# Patient Record
Sex: FEMALE | Race: BLACK OR AFRICAN AMERICAN | NOT HISPANIC OR LATINO | Employment: FULL TIME | URBAN - METROPOLITAN AREA
[De-identification: names, ages, dates, MRNs, and addresses within clinical notes are randomized per-mention and may not be internally consistent; named-entity substitution may affect disease eponyms.]

---

## 2020-07-09 ENCOUNTER — COMMUNICATION - HEALTHEAST (OUTPATIENT)
Dept: SCHEDULING | Facility: CLINIC | Age: 35
End: 2020-07-09

## 2020-07-09 ENCOUNTER — COMMUNICATION - HEALTHEAST (OUTPATIENT)
Dept: EMERGENCY MEDICINE | Facility: CLINIC | Age: 35
End: 2020-07-09

## 2020-07-10 ENCOUNTER — AMBULATORY - HEALTHEAST (OUTPATIENT)
Dept: CARE COORDINATION | Facility: CLINIC | Age: 35
End: 2020-07-10

## 2020-07-10 ENCOUNTER — COMMUNICATION - HEALTHEAST (OUTPATIENT)
Dept: NURSING | Facility: CLINIC | Age: 35
End: 2020-07-10

## 2020-07-10 DIAGNOSIS — U07.1 2019 NOVEL CORONAVIRUS DISEASE (COVID-19): ICD-10-CM

## 2020-07-20 ENCOUNTER — AMBULATORY - HEALTHEAST (OUTPATIENT)
Dept: CARE COORDINATION | Facility: CLINIC | Age: 35
End: 2020-07-20

## 2020-07-20 DIAGNOSIS — U07.1 2019 NOVEL CORONAVIRUS DISEASE (COVID-19): ICD-10-CM

## 2020-07-21 ENCOUNTER — COMMUNICATION - HEALTHEAST (OUTPATIENT)
Dept: NURSING | Facility: CLINIC | Age: 35
End: 2020-07-21

## 2020-09-03 ENCOUNTER — COMMUNICATION - HEALTHEAST (OUTPATIENT)
Dept: FAMILY MEDICINE | Facility: CLINIC | Age: 35
End: 2020-09-03

## 2020-09-03 ENCOUNTER — PRENATAL OFFICE VISIT - HEALTHEAST (OUTPATIENT)
Dept: FAMILY MEDICINE | Facility: CLINIC | Age: 35
End: 2020-09-03

## 2020-09-03 DIAGNOSIS — M54.9 BACK PAIN AFFECTING PREGNANCY IN FIRST TRIMESTER: ICD-10-CM

## 2020-09-03 DIAGNOSIS — M54.50 CHRONIC BILATERAL LOW BACK PAIN WITHOUT SCIATICA: ICD-10-CM

## 2020-09-03 DIAGNOSIS — G89.29 CHRONIC BILATERAL LOW BACK PAIN WITHOUT SCIATICA: ICD-10-CM

## 2020-09-03 DIAGNOSIS — Z86.16 HISTORY OF 2019 NOVEL CORONAVIRUS DISEASE (COVID-19): ICD-10-CM

## 2020-09-03 DIAGNOSIS — F43.10 PTSD (POST-TRAUMATIC STRESS DISORDER): ICD-10-CM

## 2020-09-03 DIAGNOSIS — Z86.32 H/O GESTATIONAL DIABETES IN PRIOR PREGNANCY, CURRENTLY PREGNANT: ICD-10-CM

## 2020-09-03 DIAGNOSIS — O99.891 BACK PAIN AFFECTING PREGNANCY IN FIRST TRIMESTER: ICD-10-CM

## 2020-09-03 DIAGNOSIS — O21.9 NAUSEA AND VOMITING IN PREGNANCY: ICD-10-CM

## 2020-09-03 DIAGNOSIS — E66.01 MORBID OBESITY (H): ICD-10-CM

## 2020-09-03 DIAGNOSIS — O09.299 H/O GESTATIONAL DIABETES IN PRIOR PREGNANCY, CURRENTLY PREGNANT: ICD-10-CM

## 2020-09-03 DIAGNOSIS — F39 MOOD DISORDER (H): ICD-10-CM

## 2020-09-03 DIAGNOSIS — O09.529 SUPERVISION OF HIGH-RISK PREGNANCY OF ELDERLY MULTIGRAVIDA: ICD-10-CM

## 2020-09-03 LAB
ALBUMIN UR-MCNC: NEGATIVE MG/DL
APPEARANCE UR: CLEAR
BASOPHILS # BLD AUTO: 0 THOU/UL (ref 0–0.2)
BASOPHILS NFR BLD AUTO: 0 % (ref 0–2)
BILIRUB UR QL STRIP: NEGATIVE
COLOR UR AUTO: YELLOW
EOSINOPHIL # BLD AUTO: 0 THOU/UL (ref 0–0.4)
EOSINOPHIL NFR BLD AUTO: 1 % (ref 0–6)
ERYTHROCYTE [DISTWIDTH] IN BLOOD BY AUTOMATED COUNT: 13.2 % (ref 11–14.5)
GLUCOSE UR STRIP-MCNC: NEGATIVE MG/DL
HBA1C MFR BLD: 5.7 %
HCT VFR BLD AUTO: 37.7 % (ref 35–47)
HGB BLD-MCNC: 12.6 G/DL (ref 12–16)
HGB UR QL STRIP: NEGATIVE
HIV 1+2 AB+HIV1 P24 AG SERPL QL IA: NEGATIVE
IMM GRANULOCYTES # BLD: 0 THOU/UL
IMM GRANULOCYTES NFR BLD: 0 %
KETONES UR STRIP-MCNC: NEGATIVE MG/DL
LEUKOCYTE ESTERASE UR QL STRIP: NEGATIVE
LYMPHOCYTES # BLD AUTO: 1.4 THOU/UL (ref 0.8–4.4)
LYMPHOCYTES NFR BLD AUTO: 22 % (ref 20–40)
MCH RBC QN AUTO: 27.5 PG (ref 27–34)
MCHC RBC AUTO-ENTMCNC: 33.4 G/DL (ref 32–36)
MCV RBC AUTO: 82 FL (ref 80–100)
MONOCYTES # BLD AUTO: 0.3 THOU/UL (ref 0–0.9)
MONOCYTES NFR BLD AUTO: 5 % (ref 2–10)
NEUTROPHILS # BLD AUTO: 4.5 THOU/UL (ref 2–7.7)
NEUTROPHILS NFR BLD AUTO: 72 % (ref 50–70)
NITRATE UR QL: NEGATIVE
PH UR STRIP: 7 [PH] (ref 5–8)
PLATELET # BLD AUTO: 267 THOU/UL (ref 140–440)
PMV BLD AUTO: 10.9 FL (ref 8.5–12.5)
RBC # BLD AUTO: 4.58 MILL/UL (ref 3.8–5.4)
SP GR UR STRIP: 1.02 (ref 1–1.03)
UROBILINOGEN UR STRIP-ACNC: NORMAL
WBC: 6.3 THOU/UL (ref 4–11)

## 2020-09-03 ASSESSMENT — ANXIETY QUESTIONNAIRES
1. FEELING NERVOUS, ANXIOUS, OR ON EDGE: MORE THAN HALF THE DAYS
7. FEELING AFRAID AS IF SOMETHING AWFUL MIGHT HAPPEN: MORE THAN HALF THE DAYS
2. NOT BEING ABLE TO STOP OR CONTROL WORRYING: NEARLY EVERY DAY
IF YOU CHECKED OFF ANY PROBLEMS ON THIS QUESTIONNAIRE, HOW DIFFICULT HAVE THESE PROBLEMS MADE IT FOR YOU TO DO YOUR WORK, TAKE CARE OF THINGS AT HOME, OR GET ALONG WITH OTHER PEOPLE: VERY DIFFICULT
5. BEING SO RESTLESS THAT IT IS HARD TO SIT STILL: MORE THAN HALF THE DAYS
GAD7 TOTAL SCORE: 17
3. WORRYING TOO MUCH ABOUT DIFFERENT THINGS: NEARLY EVERY DAY
6. BECOMING EASILY ANNOYED OR IRRITABLE: NEARLY EVERY DAY
4. TROUBLE RELAXING: MORE THAN HALF THE DAYS

## 2020-09-03 ASSESSMENT — PATIENT HEALTH QUESTIONNAIRE - PHQ9: SUM OF ALL RESPONSES TO PHQ QUESTIONS 1-9: 16

## 2020-09-04 LAB
ABO/RH(D): NORMAL
ABORH REPEAT: NORMAL
ANTIBODY SCREEN: NEGATIVE
BACTERIA SPEC CULT: NO GROWTH
HBV SURFACE AG SERPL QL IA: NEGATIVE
RUBV IGG SERPL QL IA: POSITIVE
T PALLIDUM AB SER QL: NEGATIVE

## 2020-09-08 ENCOUNTER — COMMUNICATION - HEALTHEAST (OUTPATIENT)
Dept: FAMILY MEDICINE | Facility: CLINIC | Age: 35
End: 2020-09-08

## 2020-09-08 DIAGNOSIS — O09.529 SUPERVISION OF HIGH-RISK PREGNANCY OF ELDERLY MULTIGRAVIDA: ICD-10-CM

## 2020-09-15 ENCOUNTER — COMMUNICATION - HEALTHEAST (OUTPATIENT)
Dept: FAMILY MEDICINE | Facility: CLINIC | Age: 35
End: 2020-09-15

## 2020-09-15 DIAGNOSIS — O09.529 SUPERVISION OF HIGH-RISK PREGNANCY OF ELDERLY MULTIGRAVIDA: ICD-10-CM

## 2020-09-21 ENCOUNTER — OFFICE VISIT - HEALTHEAST (OUTPATIENT)
Dept: PHYSICAL THERAPY | Facility: REHABILITATION | Age: 35
End: 2020-09-21

## 2020-09-21 DIAGNOSIS — Z34.91 FIRST TRIMESTER PREGNANCY: ICD-10-CM

## 2020-09-21 DIAGNOSIS — M54.50 CHRONIC BILATERAL LOW BACK PAIN WITHOUT SCIATICA: ICD-10-CM

## 2020-09-21 DIAGNOSIS — M62.81 GENERALIZED MUSCLE WEAKNESS: ICD-10-CM

## 2020-09-21 DIAGNOSIS — G89.29 CHRONIC BILATERAL LOW BACK PAIN WITHOUT SCIATICA: ICD-10-CM

## 2020-09-25 ENCOUNTER — COMMUNICATION - HEALTHEAST (OUTPATIENT)
Dept: FAMILY MEDICINE | Facility: CLINIC | Age: 35
End: 2020-09-25

## 2020-09-28 ENCOUNTER — COMMUNICATION - HEALTHEAST (OUTPATIENT)
Dept: PHYSICAL THERAPY | Facility: REHABILITATION | Age: 35
End: 2020-09-28

## 2020-10-06 ENCOUNTER — COMMUNICATION - HEALTHEAST (OUTPATIENT)
Dept: PHYSICAL THERAPY | Facility: REHABILITATION | Age: 35
End: 2020-10-06

## 2020-10-07 ENCOUNTER — PRENATAL OFFICE VISIT - HEALTHEAST (OUTPATIENT)
Dept: FAMILY MEDICINE | Facility: CLINIC | Age: 35
End: 2020-10-07

## 2020-10-07 ENCOUNTER — RECORDS - HEALTHEAST (OUTPATIENT)
Dept: ADMINISTRATIVE | Facility: OTHER | Age: 35
End: 2020-10-07

## 2020-10-07 DIAGNOSIS — O99.519 ASTHMA AFFECTING PREGNANCY, ANTEPARTUM: ICD-10-CM

## 2020-10-07 DIAGNOSIS — J45.909 ASTHMA AFFECTING PREGNANCY, ANTEPARTUM: ICD-10-CM

## 2020-10-07 DIAGNOSIS — Z86.32 H/O GESTATIONAL DIABETES IN PRIOR PREGNANCY, CURRENTLY PREGNANT: ICD-10-CM

## 2020-10-07 DIAGNOSIS — O09.299 H/O GESTATIONAL DIABETES IN PRIOR PREGNANCY, CURRENTLY PREGNANT: ICD-10-CM

## 2020-10-07 DIAGNOSIS — O09.529 SUPERVISION OF HIGH-RISK PREGNANCY OF ELDERLY MULTIGRAVIDA: ICD-10-CM

## 2020-10-14 ENCOUNTER — COMMUNICATION - HEALTHEAST (OUTPATIENT)
Dept: FAMILY MEDICINE | Facility: CLINIC | Age: 35
End: 2020-10-14

## 2020-10-15 ENCOUNTER — COMMUNICATION - HEALTHEAST (OUTPATIENT)
Dept: FAMILY MEDICINE | Facility: CLINIC | Age: 35
End: 2020-10-15

## 2020-11-13 ENCOUNTER — PRENATAL OFFICE VISIT - HEALTHEAST (OUTPATIENT)
Dept: FAMILY MEDICINE | Facility: CLINIC | Age: 35
End: 2020-11-13

## 2020-11-13 DIAGNOSIS — Z20.822 SUSPECTED COVID-19 VIRUS INFECTION: ICD-10-CM

## 2020-11-13 DIAGNOSIS — Z86.16 HISTORY OF 2019 NOVEL CORONAVIRUS DISEASE (COVID-19): ICD-10-CM

## 2020-11-13 DIAGNOSIS — K21.00 GASTROESOPHAGEAL REFLUX DISEASE WITH ESOPHAGITIS WITHOUT HEMORRHAGE: ICD-10-CM

## 2020-11-13 DIAGNOSIS — O09.529 SUPERVISION OF HIGH-RISK PREGNANCY OF ELDERLY MULTIGRAVIDA: ICD-10-CM

## 2020-11-15 ENCOUNTER — COMMUNICATION - HEALTHEAST (OUTPATIENT)
Dept: SCHEDULING | Facility: CLINIC | Age: 35
End: 2020-11-15

## 2020-11-15 LAB
# FETUSES US: NORMAL
AFP MOM SERPL: 0.91
AFP SERPL-MCNC: 31 NG/ML
AGE - REPORTED: 34 YR
CURRENT SMOKER: NO
FAMILY MEMBER DISEASES HX: NO
GA METHOD: NORMAL
GA: NORMAL WK
IDDM PATIENT QL: NO
INTEGRATED SCN PATIENT-IMP: NORMAL
SPECIMEN DRAWN SERPL: NORMAL

## 2020-12-01 ENCOUNTER — COMMUNICATION - HEALTHEAST (OUTPATIENT)
Dept: FAMILY MEDICINE | Facility: CLINIC | Age: 35
End: 2020-12-01

## 2020-12-04 ENCOUNTER — HOSPITAL ENCOUNTER (OUTPATIENT)
Dept: ULTRASOUND IMAGING | Facility: CLINIC | Age: 35
Discharge: HOME | End: 2020-12-04
Attending: FAMILY MEDICINE

## 2020-12-04 DIAGNOSIS — O09.529 SUPERVISION OF HIGH-RISK PREGNANCY OF ELDERLY MULTIGRAVIDA: ICD-10-CM

## 2020-12-07 ENCOUNTER — COMMUNICATION - HEALTHEAST (OUTPATIENT)
Dept: FAMILY MEDICINE | Facility: CLINIC | Age: 35
End: 2020-12-07

## 2020-12-21 ENCOUNTER — HOSPITAL ENCOUNTER (OUTPATIENT)
Dept: ULTRASOUND IMAGING | Facility: CLINIC | Age: 35
Discharge: HOME | End: 2020-12-21
Attending: FAMILY MEDICINE

## 2020-12-21 DIAGNOSIS — O09.529 SUPERVISION OF HIGH-RISK PREGNANCY OF ELDERLY MULTIGRAVIDA: ICD-10-CM

## 2021-01-13 ENCOUNTER — PRENATAL OFFICE VISIT - HEALTHEAST (OUTPATIENT)
Dept: FAMILY MEDICINE | Facility: CLINIC | Age: 36
End: 2021-01-13

## 2021-01-13 DIAGNOSIS — K21.9 CHRONIC GERD: ICD-10-CM

## 2021-01-13 DIAGNOSIS — O99.519 ASTHMA AFFECTING PREGNANCY, ANTEPARTUM: ICD-10-CM

## 2021-01-13 DIAGNOSIS — J45.909 ASTHMA AFFECTING PREGNANCY, ANTEPARTUM: ICD-10-CM

## 2021-01-13 DIAGNOSIS — O24.410 GDM, CLASS A1: ICD-10-CM

## 2021-01-13 DIAGNOSIS — O09.529 SUPERVISION OF HIGH-RISK PREGNANCY OF ELDERLY MULTIGRAVIDA: ICD-10-CM

## 2021-01-13 DIAGNOSIS — O24.410 DIET CONTROLLED GESTATIONAL DIABETES MELLITUS (GDM) IN THIRD TRIMESTER: ICD-10-CM

## 2021-01-13 DIAGNOSIS — Z86.32 H/O GESTATIONAL DIABETES IN PRIOR PREGNANCY, CURRENTLY PREGNANT: ICD-10-CM

## 2021-01-13 DIAGNOSIS — O09.299 H/O GESTATIONAL DIABETES IN PRIOR PREGNANCY, CURRENTLY PREGNANT: ICD-10-CM

## 2021-01-14 ENCOUNTER — AMBULATORY - HEALTHEAST (OUTPATIENT)
Dept: LAB | Facility: CLINIC | Age: 36
End: 2021-01-14

## 2021-01-14 ENCOUNTER — AMBULATORY - HEALTHEAST (OUTPATIENT)
Dept: NURSING | Facility: CLINIC | Age: 36
End: 2021-01-14

## 2021-01-14 DIAGNOSIS — Z86.16 HISTORY OF 2019 NOVEL CORONAVIRUS DISEASE (COVID-19): ICD-10-CM

## 2021-01-14 DIAGNOSIS — O09.529 SUPERVISION OF HIGH-RISK PREGNANCY OF ELDERLY MULTIGRAVIDA: ICD-10-CM

## 2021-01-14 LAB
FASTING STATUS PATIENT QL REPORTED: ABNORMAL
GLUCOSE 1H P 50 G GLC PO SERPL-MCNC: 181 MG/DL (ref 70–139)
HGB BLD-MCNC: 10.7 G/DL (ref 12–16)

## 2021-01-15 LAB — T PALLIDUM AB SER QL: NEGATIVE

## 2021-01-25 ENCOUNTER — COMMUNICATION - HEALTHEAST (OUTPATIENT)
Dept: EDUCATION SERVICES | Facility: CLINIC | Age: 36
End: 2021-01-25

## 2021-01-25 ENCOUNTER — PATIENT OUTREACH (OUTPATIENT)
Dept: EDUCATION SERVICES | Facility: CLINIC | Age: 36
End: 2021-01-25
Payer: COMMERCIAL

## 2021-01-25 ENCOUNTER — TELEPHONE (OUTPATIENT)
Dept: EDUCATION SERVICES | Facility: CLINIC | Age: 36
End: 2021-01-25

## 2021-01-25 DIAGNOSIS — O24.419 GESTATIONAL DIABETES: ICD-10-CM

## 2021-01-25 DIAGNOSIS — O24.419 GESTATIONAL DIABETES: Primary | ICD-10-CM

## 2021-01-25 PROCEDURE — G0108 DIAB MANAGE TRN  PER INDIV: HCPCS | Mod: 95

## 2021-01-25 RX ORDER — BLOOD-GLUCOSE METER
1 EACH MISCELLANEOUS ONCE
COMMUNITY
Start: 2021-01-25 | End: 2022-02-01

## 2021-01-25 RX ORDER — BLOOD-GLUCOSE METER
EACH MISCELLANEOUS
Qty: 1 KIT | Refills: 0 | Status: CANCELLED | OUTPATIENT
Start: 2021-01-25

## 2021-01-25 RX ORDER — LANCETS
100 EACH MISCELLANEOUS 4 TIMES DAILY
COMMUNITY
Start: 2021-01-25 | End: 2022-02-01

## 2021-01-25 NOTE — TELEPHONE ENCOUNTER
Rx's sent as requested through [facility] and placed into [facility] for record.    Tessie Wilson RN, [facility]

## 2021-01-25 NOTE — PROGRESS NOTES
Diabetes Self-Management Education & Support  Type of Service: Telephone Visit    How would patient like to obtain AVS? Carmen    SUBJECTIVE/OBJECTIVE:  Presents for education related to gestational diabetes.    Accompanied by: Self  Gestational weeks: 27  Hospital planned for delivery: [facility]  Next OB Visit Date: 21  Number of previous preganancies: 3  Had any babies over 9 lbs: No  Previously had Gestational Diabetes: No  Have you ever had thyroid problems or taken thyroid medication?: No  Hypertension : No  High Cholesterol: No  Do you use tobacco products?: No  Do you drink beer, wine or hard liquor?: No    Cultural Influences/Ethnic Background:  Choose not to answer      Estimated Date of Delivery: Data Unavailable    1 hour OGTT  No results found for: GLU1    3 hour OGTT    Fasting  No results found for: GLF    1 hour  No results found for: GL1    2 hour  No results found for: GL2    3 hour  No results found for: GL3    Lifestyle and Health Behaviors:  Pre-pregnancy weight (lbs): 226  Cultural/Caodaism diet restrictions?: No  Meal planning/habits: None  Breakfast: 1 slice bread, meat OR Blankenship salad OR oatmeal  Lunch: salad - cheese, meat (chicken, turkey)  Dinner: meat (chicken), veggie (beans) OR sloppy joes (1/2 bun), mashed potatoes  Snacks: almonds, chips + dip, avocado  Beverages: Water, Tea  Biggest challenges to healthy eating: None  Pre- vitamin?: Yes  Supplements?: No  Experiencing nausea?: No  Experiencing heartburn?: Yes    Healthy Coping:  Emotional response to diabetes: Ready to learn  Stage of change: PREPARATION (Decided to change - considering how)    Current Management:  Taking medications for gestational diabetes?: No    ASSESSMENT:  Patient with new dx of GDM and needs comprehensive education.  Patient was not able to take my call at our scheduled time today (9:30), therefore we only time for a 30 minutes appointment.  Patient stated she did go through the materials that were  emailed to her.  We were able to discuss the pathophysiology of GDM and discuss BG/ketone testing today.  She will need diet education at the follow-up appt next week.    INTERVENTION:  Patient was instructed on Contour Next  meter and supplies were ordered.     Educational topics covered today:  GDM diagnosis, pathophysiology, Risks and Complications of GDM, Means of controlling GDM, Using a Blood Glucose Monitor, Blood Glucose Goals, Logging and Interpreting Glucose Results, Ketone Testing, When to Call a Diabetes Educator or OB Provider,     Educational materials provided today:   [place] Understanding Gestational Diabetes  GDM Log Book  Sharps Disposal  Care After Delivery      Pt verbalized understanding of concepts discussed and recommendations provided today.     PLAN:  Check glucose 4 times daily, before breakfast and 1 hour after each meal.     Check Ketones daily for one week, if negative, reduce testing to once a week.     Physical activity recommended: as able.    Meal plan: 2-3 carbs at breakfast, 3-4 carbs at lunch, 3-4 carbs at supper, 1-2 carbs at 3 snacks a day.  Follow consistent CHO meal plan, eat CHO and protein/fat at all meals/snacks.    Call/e-mail/MyChart message diabetes educator if 3 or more blood sugars are above the goal in 1 week, if ketones are positive, or with questions/concerns.    STEPHANIA Plok CDE    Time Spent: 30 minutes  Encounter Type: Individual    Any diabetes medication dose changes were made via the CDE Protocol and Collaborative Practice Agreement with the patient's referring provider. A copy of this encounter was shared with the provider.

## 2021-01-25 NOTE — TELEPHONE ENCOUNTER
----- Message from Tatum Cheung RD sent at 1/25/2021 11:17 AM CST -----  Patient referred from [facility]. Please send in the diabetes supply prescriptions placed today to [facility] pharmacy for patient. Provider name is Collin Sams.    I can't figure out how to add the meter and supplies to her chart for you.  I want to order the Contour Next meter, test strips, lancets and ketone test strips.

## 2021-01-27 ENCOUNTER — PRENATAL OFFICE VISIT - HEALTHEAST (OUTPATIENT)
Dept: FAMILY MEDICINE | Facility: CLINIC | Age: 36
End: 2021-01-27

## 2021-01-27 ENCOUNTER — COMMUNICATION - HEALTHEAST (OUTPATIENT)
Dept: FAMILY MEDICINE | Facility: CLINIC | Age: 36
End: 2021-01-27

## 2021-01-27 DIAGNOSIS — O09.529 SUPERVISION OF HIGH-RISK PREGNANCY OF ELDERLY MULTIGRAVIDA: ICD-10-CM

## 2021-01-27 DIAGNOSIS — E66.01 MORBID OBESITY (H): ICD-10-CM

## 2021-01-27 DIAGNOSIS — O24.410 DIET CONTROLLED GESTATIONAL DIABETES MELLITUS (GDM) IN THIRD TRIMESTER: ICD-10-CM

## 2021-02-10 ENCOUNTER — PRENATAL OFFICE VISIT - HEALTHEAST (OUTPATIENT)
Dept: FAMILY MEDICINE | Facility: CLINIC | Age: 36
End: 2021-02-10

## 2021-02-10 DIAGNOSIS — J45.909 ASTHMA AFFECTING PREGNANCY, ANTEPARTUM: ICD-10-CM

## 2021-02-10 DIAGNOSIS — O24.410 DIET CONTROLLED GESTATIONAL DIABETES MELLITUS (GDM) IN THIRD TRIMESTER: ICD-10-CM

## 2021-02-10 DIAGNOSIS — O24.419 GESTATIONAL DIABETES: ICD-10-CM

## 2021-02-10 DIAGNOSIS — O99.013 ANEMIA OF PREGNANCY IN THIRD TRIMESTER: ICD-10-CM

## 2021-02-10 DIAGNOSIS — E66.01 MORBID OBESITY (H): ICD-10-CM

## 2021-02-10 DIAGNOSIS — O99.519 ASTHMA AFFECTING PREGNANCY, ANTEPARTUM: ICD-10-CM

## 2021-02-10 DIAGNOSIS — O09.529 SUPERVISION OF HIGH-RISK PREGNANCY OF ELDERLY MULTIGRAVIDA: ICD-10-CM

## 2021-02-16 ENCOUNTER — RECORDS - HEALTHEAST (OUTPATIENT)
Dept: ADMINISTRATIVE | Facility: OTHER | Age: 36
End: 2021-02-16

## 2021-02-17 ENCOUNTER — COMMUNICATION - HEALTHEAST (OUTPATIENT)
Dept: FAMILY MEDICINE | Facility: CLINIC | Age: 36
End: 2021-02-17

## 2021-02-24 ENCOUNTER — HOSPITAL ENCOUNTER (OUTPATIENT)
Dept: ULTRASOUND IMAGING | Facility: CLINIC | Age: 36
Discharge: HOME | End: 2021-02-24
Attending: FAMILY MEDICINE

## 2021-02-24 DIAGNOSIS — E66.01 MORBID OBESITY (H): ICD-10-CM

## 2021-02-24 DIAGNOSIS — O24.410 DIET CONTROLLED GESTATIONAL DIABETES MELLITUS (GDM) IN THIRD TRIMESTER: ICD-10-CM

## 2021-02-24 DIAGNOSIS — O09.529 SUPERVISION OF HIGH-RISK PREGNANCY OF ELDERLY MULTIGRAVIDA: ICD-10-CM

## 2021-02-26 ENCOUNTER — PRENATAL OFFICE VISIT - HEALTHEAST (OUTPATIENT)
Dept: FAMILY MEDICINE | Facility: CLINIC | Age: 36
End: 2021-02-26

## 2021-02-26 DIAGNOSIS — O24.410 DIET CONTROLLED GESTATIONAL DIABETES MELLITUS (GDM) IN THIRD TRIMESTER: ICD-10-CM

## 2021-03-01 ENCOUNTER — COMMUNICATION - HEALTHEAST (OUTPATIENT)
Dept: FAMILY MEDICINE | Facility: CLINIC | Age: 36
End: 2021-03-01

## 2021-03-01 ENCOUNTER — OFFICE VISIT - HEALTHEAST (OUTPATIENT)
Dept: FAMILY MEDICINE | Facility: CLINIC | Age: 36
End: 2021-03-01

## 2021-03-01 DIAGNOSIS — R35.0 URINE FREQUENCY: ICD-10-CM

## 2021-03-06 ENCOUNTER — COMMUNICATION - HEALTHEAST (OUTPATIENT)
Dept: FAMILY MEDICINE | Facility: CLINIC | Age: 36
End: 2021-03-06

## 2021-03-07 ENCOUNTER — HOSPITAL ENCOUNTER (OUTPATIENT)
Dept: MEDSURG UNIT | Facility: CLINIC | Age: 36
Discharge: HOME | End: 2021-03-07
Attending: FAMILY MEDICINE | Admitting: FAMILY MEDICINE

## 2021-03-07 LAB
ALBUMIN UR-MCNC: NEGATIVE MG/DL
APPEARANCE UR: CLEAR
BILIRUB UR QL STRIP: NEGATIVE
COLOR UR AUTO: NORMAL
GLUCOSE UR STRIP-MCNC: NEGATIVE MG/DL
HGB UR QL STRIP: NEGATIVE
KETONES UR STRIP-MCNC: NEGATIVE MG/DL
LEUKOCYTE ESTERASE UR QL STRIP: NEGATIVE
NITRATE UR QL: NEGATIVE
PH UR STRIP: 7 [PH] (ref 4.5–8)
SP GR UR STRIP: 1 (ref 1–1.03)
UROBILINOGEN UR STRIP-ACNC: NORMAL

## 2021-03-07 ASSESSMENT — MIFFLIN-ST. JEOR: SCORE: 1655.7

## 2021-03-18 ENCOUNTER — PRENATAL OFFICE VISIT - HEALTHEAST (OUTPATIENT)
Dept: FAMILY MEDICINE | Facility: CLINIC | Age: 36
End: 2021-03-18

## 2021-03-18 DIAGNOSIS — O24.414 GESTATIONAL DIABETES MELLITUS (GDM) REQUIRING INSULIN: ICD-10-CM

## 2021-03-18 DIAGNOSIS — E66.01 MORBID OBESITY (H): ICD-10-CM

## 2021-03-18 DIAGNOSIS — O24.410 DIET CONTROLLED GESTATIONAL DIABETES MELLITUS (GDM) IN THIRD TRIMESTER: ICD-10-CM

## 2021-03-18 DIAGNOSIS — O09.529 SUPERVISION OF HIGH-RISK PREGNANCY OF ELDERLY MULTIGRAVIDA: ICD-10-CM

## 2021-03-19 LAB
ALLERGIC TO PENICILLIN: NO
GP B STREP DNA SPEC QL NAA+PROBE: NEGATIVE

## 2021-03-22 ENCOUNTER — ALLIED HEALTH/NURSE VISIT (OUTPATIENT)
Dept: EDUCATION SERVICES | Facility: CLINIC | Age: 36
End: 2021-03-22
Payer: COMMERCIAL

## 2021-03-22 DIAGNOSIS — O24.414 INSULIN CONTROLLED GESTATIONAL DIABETES MELLITUS (GDM) IN THIRD TRIMESTER: Primary | ICD-10-CM

## 2021-03-22 PROCEDURE — 98968 PH1 ASSMT&MGMT NQHP 21-30: CPT

## 2021-03-22 NOTE — PROGRESS NOTES
"PHONE Diabetes Self-Management Education & Support    ~ sounds like \"Supa\"    SUBJECTIVE/OBJECTIVE:  Presents for education related to gestational diabetes.    Accompanied by: Self  Diabetes management related comments/concerns: patient says that she has GDM, 2 weeks to delivery, doc wants her on insulin until delivery- doc ordered  Gestational weeks: 36 now  Hospital planned for delivery: [facility]    Cultural Influences/Ethnic Background:  Choose not to answer      There were no vitals taken for this visit.      Estimated Date of Delivery: plan to induce on 4/2 (at 38 weeks)    Blood Glucose/Ketone Log:   Checks fasting, after BR, FUNMILAYO, DIN  Date Ketones Fasting Post Breakfast Post Lunch Post Supper   3/17 T-S 93 133 152 164   18  103 130 154 152   19   111 122 125 132   20   97 133 152 132   21  110 163 160 154   22  104                Says she has checked ketones a few times, \"only once had the cranberry color\".    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   Fasting blood glucoses: 17% in target.  After breakfast: 80% in target.  After lunch: 20% in target.  After dinner: 40% in target.    She will start 12 units of Lantus tonight.     Karuna says this is her 5th pregnancy and last.     INTERVENTION:  Start insulin tonight at HS, take within 1 hour of the same time each day    Educational Materials provided today:  No new    PLAN:  Check glucose 4 times daily.  Check ketones once a week when readings are consistently negative.  Continue with recommended physical activity.  Continue to follow " recommended meal plan:  Encouraged HS snack can help numbers stay down- did not do detailed review or meal plan today with insulin start- if elevated post-meal with insulin, do more assessment    Scheduled for F/U call on 3/25 and 3/29 before delivery 4/2    Joselin Cat RD, LD, [facility]    Time Spent: 23 minutes  Encounter Type: Individual    Any diabetes medication dose changes were made via the CDE Protocol and Collaborative Practice Agreement with the patient's OB/GYN provider.

## 2021-03-23 ENCOUNTER — HOSPITAL ENCOUNTER (OUTPATIENT)
Dept: ULTRASOUND IMAGING | Facility: CLINIC | Age: 36
Discharge: HOME | End: 2021-03-23
Attending: FAMILY MEDICINE

## 2021-03-23 DIAGNOSIS — O24.414 GESTATIONAL DIABETES MELLITUS (GDM) REQUIRING INSULIN: ICD-10-CM

## 2021-03-23 DIAGNOSIS — O09.529 SUPERVISION OF HIGH-RISK PREGNANCY OF ELDERLY MULTIGRAVIDA: ICD-10-CM

## 2021-03-24 ENCOUNTER — PRENATAL OFFICE VISIT - HEALTHEAST (OUTPATIENT)
Dept: FAMILY MEDICINE | Facility: CLINIC | Age: 36
End: 2021-03-24
Payer: COMMERCIAL

## 2021-03-24 DIAGNOSIS — O24.414 GESTATIONAL DIABETES MELLITUS (GDM) REQUIRING INSULIN: ICD-10-CM

## 2021-03-24 DIAGNOSIS — O09.529 SUPERVISION OF HIGH-RISK PREGNANCY OF ELDERLY MULTIGRAVIDA: ICD-10-CM

## 2021-03-25 ENCOUNTER — VIRTUAL VISIT (OUTPATIENT)
Dept: EDUCATION SERVICES | Facility: CLINIC | Age: 36
End: 2021-03-25
Payer: COMMERCIAL

## 2021-03-25 DIAGNOSIS — O24.414 INSULIN CONTROLLED GESTATIONAL DIABETES MELLITUS (GDM) IN THIRD TRIMESTER: Primary | ICD-10-CM

## 2021-03-25 NOTE — PROGRESS NOTES
Gestational Diabetes Follow-up    Subjective/Objective:    Karuna Ann was called for a scheduled BG review. Last date of communication was: 3/22/2021    Gestational diabetes is being managed with medications    Taking diabetes medications: yes:    Lantus 12 units daily    Estimated Date of Delivery: Data Unavailable    Blood Glucose/Ketone Log:    Fastings -- 92, 93, -- always under the 95 range!  After meals all of those are 130-140mg/dL, eating a lot of protein and veggies    Assessment:    Patient was able to converse that she understands that the placenta is the driving force of her blood sugars rising, and she is now thankful to have insulin to give her better numbers. She did not list off all of her blood sugars, kept speaking in ranges, however they do sound appropriate today.     Plan/Response:  No changes in the patient's current treatment plan.  Follow-up on Monday.    STEPHANIA Caldeorn CDE  Time Spent: <5 minutes    Any diabetes medication dose changes were made via the CDE Protocol and Collaborative Practice Agreement with the patient's OB/GYN provider. A copy of this encounter was shared with the provider.

## 2021-03-29 ENCOUNTER — VIRTUAL VISIT (OUTPATIENT)
Dept: EDUCATION SERVICES | Facility: CLINIC | Age: 36
End: 2021-03-29
Payer: COMMERCIAL

## 2021-03-29 DIAGNOSIS — O24.414 INSULIN CONTROLLED GESTATIONAL DIABETES MELLITUS (GDM) IN THIRD TRIMESTER: Primary | ICD-10-CM

## 2021-03-29 NOTE — PROGRESS NOTES
Gestational Diabetes Follow-up    Subjective/Objective:    Karuna Ann was called for a scheduled BG review. Last date of communication was: 3/25/2021.     Gestational diabetes is being managed with medications    Taking diabetes medications: yes:   Lantus 12 units daily    Estimated Date of Delivery: Data Unavailable    Blood Glucose/Ketone Log:      Self reported numbers (didn't have log book with her), but reports all blood sugars in range (gave examples of 91, 92 in the morning).    Assessment:    Reports in range with 12 units Lantus    Plan/Response:  No changes in the patient's current treatment plan. Delivery planned for 4/1    STEPHANIA Calderon CDE  Time Spent: <5 minutes    Any diabetes medication dose changes were made via the CDE Protocol and Collaborative Practice Agreement with the patient's OB/GYN provider. A copy of this encounter was shared with the provider.

## 2021-03-30 ENCOUNTER — COMMUNICATION - HEALTHEAST (OUTPATIENT)
Dept: FAMILY MEDICINE | Facility: CLINIC | Age: 36
End: 2021-03-30

## 2021-03-31 ENCOUNTER — AMBULATORY - HEALTHEAST (OUTPATIENT)
Dept: OBGYN | Facility: CLINIC | Age: 36
End: 2021-03-31

## 2021-03-31 ENCOUNTER — PRENATAL OFFICE VISIT - HEALTHEAST (OUTPATIENT)
Dept: FAMILY MEDICINE | Facility: CLINIC | Age: 36
End: 2021-03-31
Payer: COMMERCIAL

## 2021-03-31 ENCOUNTER — HOSPITAL ENCOUNTER (OUTPATIENT)
Dept: ULTRASOUND IMAGING | Facility: CLINIC | Age: 36
Discharge: HOME | End: 2021-03-31
Attending: FAMILY MEDICINE

## 2021-03-31 DIAGNOSIS — O24.414 GESTATIONAL DIABETES MELLITUS (GDM) REQUIRING INSULIN: ICD-10-CM

## 2021-03-31 DIAGNOSIS — O99.013 ANEMIA OF PREGNANCY IN THIRD TRIMESTER: ICD-10-CM

## 2021-03-31 DIAGNOSIS — O99.519 ASTHMA AFFECTING PREGNANCY, ANTEPARTUM: ICD-10-CM

## 2021-03-31 DIAGNOSIS — O09.529 SUPERVISION OF HIGH-RISK PREGNANCY OF ELDERLY MULTIGRAVIDA: ICD-10-CM

## 2021-03-31 DIAGNOSIS — Z33.1 PREGNANT STATE, INCIDENTAL: ICD-10-CM

## 2021-03-31 DIAGNOSIS — J45.909 ASTHMA AFFECTING PREGNANCY, ANTEPARTUM: ICD-10-CM

## 2021-04-02 ENCOUNTER — AMBULATORY - HEALTHEAST (OUTPATIENT)
Dept: LAB | Facility: CLINIC | Age: 36
End: 2021-04-02

## 2021-04-02 DIAGNOSIS — Z33.1 PREGNANT STATE, INCIDENTAL: ICD-10-CM

## 2021-04-03 LAB
SARS-COV-2 PCR COMMENT: NORMAL
SARS-COV-2 RNA SPEC QL NAA+PROBE: NEGATIVE
SARS-COV-2 VIRUS SPECIMEN SOURCE: NORMAL

## 2021-04-04 ENCOUNTER — COMMUNICATION - HEALTHEAST (OUTPATIENT)
Dept: SCHEDULING | Facility: CLINIC | Age: 36
End: 2021-04-04

## 2021-04-05 ENCOUNTER — COMMUNICATION - HEALTHEAST (OUTPATIENT)
Dept: SCHEDULING | Facility: CLINIC | Age: 36
End: 2021-04-05

## 2021-04-05 ENCOUNTER — COMMUNICATION - HEALTHEAST (OUTPATIENT)
Dept: ADMINISTRATIVE | Facility: CLINIC | Age: 36
End: 2021-04-05

## 2021-04-06 ENCOUNTER — ANESTHESIA - HEALTHEAST (OUTPATIENT)
Dept: OBGYN | Facility: CLINIC | Age: 36
End: 2021-04-06

## 2021-04-08 ENCOUNTER — COMMUNICATION - HEALTHEAST (OUTPATIENT)
Dept: OBGYN | Facility: CLINIC | Age: 36
End: 2021-04-08

## 2021-05-20 ENCOUNTER — RECORDS - HEALTHEAST (OUTPATIENT)
Dept: ADMINISTRATIVE | Facility: OTHER | Age: 36
End: 2021-05-20

## 2021-05-20 ENCOUNTER — OFFICE VISIT - HEALTHEAST (OUTPATIENT)
Dept: FAMILY MEDICINE | Facility: CLINIC | Age: 36
End: 2021-05-20

## 2021-05-20 DIAGNOSIS — Z12.4 PAP SMEAR FOR CERVICAL CANCER SCREENING: ICD-10-CM

## 2021-05-20 DIAGNOSIS — Z86.32 HISTORY OF INSULIN CONTROLLED GESTATIONAL DIABETES MELLITUS (GDM): ICD-10-CM

## 2021-05-20 DIAGNOSIS — J45.30 MILD PERSISTENT ASTHMA WITHOUT COMPLICATION: ICD-10-CM

## 2021-05-20 DIAGNOSIS — Z30.430 ENCOUNTER FOR IUD INSERTION: ICD-10-CM

## 2021-05-20 LAB
ANION GAP SERPL CALCULATED.3IONS-SCNC: 9 MMOL/L (ref 5–18)
BUN SERPL-MCNC: 10 MG/DL (ref 8–22)
CALCIUM SERPL-MCNC: 8.5 MG/DL (ref 8.5–10.5)
CHLORIDE BLD-SCNC: 104 MMOL/L (ref 98–107)
CO2 SERPL-SCNC: 26 MMOL/L (ref 22–31)
CREAT SERPL-MCNC: 0.65 MG/DL (ref 0.6–1.1)
GFR SERPL CREATININE-BSD FRML MDRD: >60 ML/MIN/1.73M2
GLUCOSE BLD-MCNC: 78 MG/DL (ref 70–125)
HBA1C MFR BLD: 5.5 %
HGB BLD-MCNC: 12.1 G/DL (ref 12–16)
POTASSIUM BLD-SCNC: 4 MMOL/L (ref 3.5–5)
SODIUM SERPL-SCNC: 139 MMOL/L (ref 136–145)
TSH SERPL DL<=0.005 MIU/L-ACNC: 0.71 UIU/ML (ref 0.3–5)

## 2021-05-20 ASSESSMENT — EDINBURGH POSTNATAL DEPRESSION SCALE (EPDS): TOTAL SCORE: 13

## 2021-05-20 ASSESSMENT — MIFFLIN-ST. JEOR: SCORE: 1622.59

## 2021-05-21 LAB
HPV SOURCE: NORMAL
HUMAN PAPILLOMA VIRUS 16 DNA: NEGATIVE
HUMAN PAPILLOMA VIRUS 18 DNA: NEGATIVE
HUMAN PAPILLOMA VIRUS FINAL DIAGNOSIS: NORMAL
HUMAN PAPILLOMA VIRUS OTHER HR: NEGATIVE
SPECIMEN DESCRIPTION: NORMAL

## 2021-05-26 VITALS
WEIGHT: 221.7 LBS | HEART RATE: 88 BPM | BODY MASS INDEX: 43.3 KG/M2 | SYSTOLIC BLOOD PRESSURE: 110 MMHG | DIASTOLIC BLOOD PRESSURE: 66 MMHG

## 2021-05-26 LAB
BKR LAB AP ABNORMAL BLEEDING: NO
BKR LAB AP BIRTH CONTROL/HORMONES: NORMAL
BKR LAB AP CERVICAL APPEARANCE: NORMAL
BKR LAB AP GYN ADEQUACY: NORMAL
BKR LAB AP GYN INTERPRETATION: NORMAL
BKR LAB AP HPV REFLEX: NORMAL
BKR LAB AP LMP: NORMAL
BKR LAB AP PATIENT STATUS: NORMAL
BKR LAB AP PREVIOUS ABNORMAL: NO
BKR LAB AP PREVIOUS NORMAL: YES
HIGH RISK?: NO
PATH REPORT.COMMENTS IMP SPEC: NORMAL
RESULT FLAG (HE HISTORICAL CONVERSION): NORMAL

## 2021-05-27 VITALS — HEIGHT: 62 IN | WEIGHT: 213.7 LBS | BODY MASS INDEX: 39.09 KG/M2

## 2021-05-27 VITALS — WEIGHT: 220 LBS | BODY MASS INDEX: 42.97 KG/M2 | SYSTOLIC BLOOD PRESSURE: 110 MMHG | DIASTOLIC BLOOD PRESSURE: 70 MMHG

## 2021-05-27 VITALS — OXYGEN SATURATION: 97 % | SYSTOLIC BLOOD PRESSURE: 100 MMHG | HEART RATE: 77 BPM | DIASTOLIC BLOOD PRESSURE: 74 MMHG

## 2021-05-28 ASSESSMENT — ANXIETY QUESTIONNAIRES: GAD7 TOTAL SCORE: 17

## 2021-05-28 ASSESSMENT — ASTHMA QUESTIONNAIRES: ACT_TOTALSCORE: 23

## 2021-06-05 VITALS — HEIGHT: 60 IN | BODY MASS INDEX: 44.76 KG/M2 | WEIGHT: 228 LBS

## 2021-06-09 NOTE — TELEPHONE ENCOUNTER
Coronavirus (COVID-19) Notification    Patient  Karuna Ann    Reason for call  Notify of Positive Coronavirus (COVID-19) lab results, assess symptoms,  review [facility] recommendations    Lab Result    Lab test:  2019-nCoV rRt-PCR or SARS-CoV-2 PCR    Oropharyngeal AND/OR nasopharyngeal swabs is POSITIVE for 2019-nCoV RNA/SARS-COV-2 PCR (COVID-19 virus)    RN Recommendations/Instructions per [facility] Coronavirus COVID-19 recommendations    Brief introduction script  Hi, My name is Corinne and I am calling on behalf of [employer].  We were notified that your Coronavirus test (COVID-19) for was POSITIVE for the virus.  I have some information to relay to you but first I wanted to mention that the [place] Dept of Health will be contacting you shortly [it's possible MD already called Patient] to talk to you more about how you are feeling and other people you have had contact with who might now also have the virus.  Also, [facility] is Partnering with the [facility] for Covid-19 research, you may be contacted directly by research staff.    Assessment (Inquire about Patient's current symptoms)  No taste, No smell, chest pain with breathing, fever, respiratory symptoms getting worse.    If at time of call, Patients symptoms hare worsened, the Patient should contact 911 or have someone drive them to Emergency Dept promptly:      If Patient calling 911, inform 911 personal that you have tested positive for the Coronavirus (COVID-19).  Place mask on and await 911 to arrive.    If Emergency Dept, If possible, please have another adult drive you to the Emergency Dept but you need to wear mask when in contact with other people.      Review information with Patient    Your result was positive. This means you have COVID-19 (coronavirus).  We have sent you a letter that reviews the information that I'll be reviewing with you now.    How can I protect others?    If you have symptoms: stay home and  away from others (self-isolate) until:    You've had no fever--and no medicine that reduces fever--for 3 full days (72 hours). And      Your other symptoms have gotten better. For example, your cough or breathing has improved. And     At least 10 days have passed since your symptoms started.    If you don't have symptoms: Stay home and away from others (self-isolate) until at least 10 days have passed since your first positive COVID-19 test. (Date test collected).    During this time:    Stay in your own room, including for meals. Use your own bathroom if you can.    Stay away from others in your home. No hugging, kissing or shaking hands. No visitors.     Don't go to work, school or anywhere else.     Clean  high touch  surfaces often (doorknobs, counters, handles, etc.). Use a household cleaning spray or wipes. You'll find a full list on the EPA website at www.epa.gov/pesticide-registration/list-n-disinfectants-use-against-sars-cov-2.     Cover your mouth and nose with a mask, tissue or washcloth to avoid spreading germs.    Wash your hands and face often with soap and water.    Caregivers in these groups are at risk for severe illness due to COVID-19:  o People 65 years and older  o People who live in a nursing home or long-term care facility  o People with chronic disease (lung, heart, cancer, diabetes, kidney, liver, immunologic)  o People who have a weakened immune system, including those who:  - Are in cancer treatment  - Take medicine that weakens the immune system, such as corticosteroids  - Had a bone marrow or organ transplant  - Have an immune deficiency  - Have poorly controlled HIV or AIDS  - Are obese (body mass index of 40 or higher)  - Smoke regularly    Caregivers should wear gloves while washing dishes, handling laundry and cleaning bedrooms and bathrooms.    Wash and dry laundry with special caution. Don't shake dirty laundry, and use the warmest water setting you can.    If you have a weakened  immune system, ask your doctor about other actions you should take.    For more tips, go to www.cdc.gov/coronavirus/2019-ncov/downloads/10Things.pdf.    You should not go back to work until you meet the guidelines above for ending your home isolation. You should meet these along with any other guidelines that your employer has.    Employers: This document serves as formal notice of your employee's medical guidelines for going back to work. They must meet the above guidelines before going back to work in person.    How can I take care of myself?    1. Get lots of rest. Drink extra fluids (unless a doctor has told you not to).    2. Take Tylenol (acetaminophen) for fever or pain. If you have liver or kidney problems, ask your family doctor if it's okay to take Tylenol.     Take either:     650 mg (two 325 mg pills) every 4 to 6 hours, or     1,000 mg (two 500 mg pills) every 8 hours as needed.     Note: Don't take more than 3,000 mg in one day. Acetaminophen is found in many medicines (both prescribed and over-the-counter medicines). Read all labels to be sure you don't take too much.    For children, check the Tylenol bottle for the right dose (based on their age or weight).    3. If you have other health problems (like cancer, heart failure, an organ transplant or severe kidney disease): Call your specialty clinic if you don't feel better in the next 2 days.    4. Know when to call 911: Emergency warning signs include:    Trouble breathing or shortness of breath    Pain or pressure in the chest that doesn't go away    Feeling confused like you haven't felt before, or not being able to wake up    Bluish-colored lips or face    5. Sign up for [employer]. We know it's scary to hear that you have COVID-19. We want to track your symptoms to make sure you're okay over the next 2 weeks. Please look for an email from [employer]--this is a free, online program that we'll use to keep in touch. To sign up, follow the link  in the email. Learn more at [email or web address].    Where can I get more information?    [facility]: [email or web address]    Coronavirus Basics: www.health.[facility].[place]/diseases/coronavirus/basics.html    What to Do If You're Sick: www.cdc.gov/coronavirus/2019-ncov/about/steps-when-sick.html    Ending Home Isolation: www.cdc.gov/coronavirus/2019-ncov/hcp/disposition-in-home-patients.html     Caring for Someone with COVID-19: www.cdc.gov/coronavirus/2019-ncov/if-you-are-sick/care-for-someone.html     [facility] clinical trials (COVID-19 research studies): clinicalaffairs.[facility]-clinical-trials     Positive COVID-19 letter sent (Yes/No):  Yes      Corinne Marroquin RN  Covid-19 Results Team  [phone]

## 2021-06-09 NOTE — PROGRESS NOTES
The clinic Community Health Worker talked with the patient today at the request of the PCP to discuss possible Clinic Care Coordination enrollment.  The service was described to the patient and immediate needs were discussed.  The patient declined enrollement at this time.  The PCP is encouraged to refer in the future if the patient's needs change.    Patient has no concerns questions, stated she will be scheduling her f/u PCP appt      Clinic Care Coordination Contact  [facility]       Clinical Data: Care Coordinator Outreach  Outreach attempted x 1.  Attempted to reach patient, was unsuccessful= no answer  Care Coordinator will try to reach patient again in 1-2 business days.  7/13/2020      
(2) assistive person

## 2021-06-09 NOTE — PROGRESS NOTES
S= Situation-Referral received from Covid discharge list  B= Background-patient at  ED 7/16-7/17 dx with pneumonia & Covid  A= Action Step-Called and left VM.  Gave nurse triage call back number  R= Recommendation-No further CCC outreach    Outreach X 2

## 2021-06-09 NOTE — TELEPHONE ENCOUNTER
RN Triage  Karuna is calling today, she is positive for COVID 19. She has asthma. She says her chest is hurting her very bad when she breathes. She states her breathing is very shallow because it hurts so bad.    I advised Karuna she should be seen in ER again for closer evaluation of her symptoms. She agrees to this plan and will go back to  ER.      Danielle Stevenson RN  [facility] Nurse Advisor    Reason for Disposition    SEVERE or constant chest pain or pressure (Exception: mild central chest pain, present only when coughing)    MODERATE difficulty breathing (e.g., speaks in phrases, SOB even at rest, pulse 100-120)    Additional Information    Negative: SEVERE difficulty breathing (e.g., struggling for each breath, speaks in single words)    Negative: Difficult to awaken or acting confused (e.g., disoriented, slurred speech)    Negative: Bluish (or gray) lips or face now    Negative: Shock suspected (e.g., cold/pale/clammy skin, too weak to stand, low BP, rapid pulse)    Negative: Sounds like a life-threatening emergency to the triager    Protocols used: CORONAVIRUS (COVID-19) DIAGNOSED OR [id number]-MH 5.16.20    COVID 19 Nurse Triage Plan/Patient Instructions    Please be aware that novel coronavirus (COVID-19) may be circulating in the community. If you develop symptoms such as fever, cough, or SOB or if you have concerns about the presence of another infection including coronavirus (COVID-19), please contact your health care provider or visit [email or web address].     Disposition/Instructions    ED Visit recommended. Follow protocol based instructions.      Bring Your Own Device:  Please also bring your smart device(s) (smart phones, tablets, laptops) and their charging cables for your personal use and to communicate with your care team during your visit.      Thank you for taking steps to prevent the spread of this virus.  o Limit your contact with others.  o Wear a simple mask to cover your  cough.  o Wash your hands well and often.    Resources    [facility]: About COVID-19: [email or web address]    CDC: What to Do If You're Sick: www.cdc.gov/coronavirus/2019-ncov/about/steps-when-sick.html    CDC: Ending Home Isolation: www.cdc.gov/coronavirus/2019-ncov/hcp/disposition-in-home-patients.html     CDC: Caring for Someone: www.cdc.gov/coronavirus/2019-ncov/if-you-are-sick/care-for-someone.html     [facility]: Interim Guidance for Hospital Discharge to Home: www.health.[facility].[place]/diseases/coronavirus/hcp/hospdischarge.pdf    [facility] clinical trials (COVID-19 research studies): clinicalaffairs.[facility]-clinical-trials     Below are the COVID-19 hotlines at the [place] Department of Health ([facility]). Interpreters are available.   o For health questions: Call 322-075-7903 or 1-278.486.8271 (7 a.m. to 7 p.m.)  o For questions about schools and childcare: Call [phone] or [phone] (7 a.m. to 7 p.m.)

## 2021-06-09 NOTE — PROGRESS NOTES
Clinic Care Coordination Contact  [facility]/Voicemail       Clinical Data: Care Coordinator Outreach  Outreach attempted x 1.  Left message on patient's voicemail with call back information and requested return call.  Plan: Care Coordinator CHW will f/u and attempt to reach pt in 1-2 days to discuss CCC referral    Care Coordinator will try to reach patient again in 1-2 business days.  7/23    Patient has been referred to CCC 1-2x in the past, CHW has never been able to connect with patient

## 2021-06-11 NOTE — TELEPHONE ENCOUNTER
Called pharmacy and they are both covered and available for .  Called [facility] that they are available to . Mariam Rodriguez LPN

## 2021-06-11 NOTE — TELEPHONE ENCOUNTER
Central PA team  [phone]  Pool: HE PA MED ([id number])          PA has been initiated.       PA form completed and faxed insurance via Cover My Meds     Key:  [id number]     Medication:  Ondansetron 4mg    Insurance:  Blue Plus         Response will be received via fax and may take up to 5-10 business days depending on plan

## 2021-06-11 NOTE — TELEPHONE ENCOUNTER
Prior Authorization Request  Who s requesting:  Patient  [facility]  Medication Name: ondansetron (ZOFRAN-ODT) 4 MG disintegrating tablet   Insurance Plan: Blue [employer] [place]  Insurance Member ID Number:  [id number]  CoverMyMeds Key: N/A  Informed patient that prior authorizations can take up to 10 business days for response:   Yes  Okay to leave a detailed message: Yes         Quantity Exception Required! Please advise asap.

## 2021-06-11 NOTE — PATIENT INSTRUCTIONS - HE
Doctor to patient education points discussed during this prenatal visit:    Dear Karuna  Congratulations !!  You are currently at 7w4d based on you Patient's last menstrual period was 07/13/2020. and we calculate your Estimated Date of Delivery: 4/18/21   At today's visit we did basic prenatal labs  order prenatal vitamin and also if needed medications to help nausea/vomiting     Timeline for prenatal visits: I will see you every 4 weeks until 28 weeks, then every 2-3 weeks until 36 weeks, then every week until 40 weeks (your due date). If you would like to book more than one visit in advance, you can do so , then you can have more preferred appointment times.    Sign up for weekly parenting E mail   Reference it frequently during  your pregnancy and ask me any questions that arise from the book.      -  Hospital: [facility] [place] [facility] Hospital Website:       -  Doctor at delivery: The plan is for me to follow you through your pregnancy and be there at your delivery.  If I am out of town, or unavailable, I work in a group of about 15 doctors and someone from that group will cover for me.  But, again, the goal is for me to be at your delivery.    -  Timeline for prenatal visits: I will see you every 4 weeks until 28 weeks, then every 2 weeks until 36 weeks, then every week until 40 weeks (your due date).  If you would like to book more than one visit in advance, you can do that so that you have your preferred appointment times.    -  Nutrition: Some people say that when you are pregnant you are eating for two.  Really, the amount of calories needed when pregnant is not that much greater than when not pregnant.  As a result, I recommend focusing on eating as healthy as possible and your body will make sure the baby gets the necessary nutrients.     -  Weight gain in pregnancy: The amount of weight to gain in pregnancy depends on a  "patient's before becoming pregnant.  The usual suggestion is to gain between 20 to 25 pounds.  If you weigh more than the suggested weight for your height prior to getting pregnant, it is suggested that you gain less weight in pregnancy (approximately 15 to 25 pounds).  You gain most of your weight in the second (starts at 14 weeks) and third (starts at 28 weeks) trimesters.    -  Vitamins: It is important to take a prenatal vitamin daily during the pregnancy and continue this after birth if breast feeding.    -  Exercise: It is important to stay active during your pregnancy.   You don't need to buy a gym membership or do intense exercise.  Something as simple as getting out on a walk every day will help.  Labor can be a lot of work, so staying active during your pregnancy will help you be in the right condition for your labor.        -  Ultrasounds: One ultrasound that all pregnant women get is at about 20 weeks to look at the baby's anatomy (brain, heart, kidneys, etc) and growth.  Sometimes we have to do an ultrasound early in pregnancy to confirm the due date.  Sometimes we have to do other ultrasounds in pregnancy if issues arise.      -  Hazards: Avoid smoking, alcohol and recreational drugs during pregnancy.  Avoid overheating (for example with hot tubs or summer heat).  Don't change a cat's litter box due to a potential bacterial that can be in the litter box called toxoplasma.  Avoid certain foods due to the risk of different types of bacteria: raw meat and unpasteurized milk (so milk from the grocery store is okay but not straight from a farm).    -  A special word about cheeses: some soft cheeses (such as feta, Brie, Camembert, blue-veined cheese, and Mexican-style cheese like \"queso neal fresco\") are made from unpasteurized milk (also called raw milk) and can carry disease-causing bacteria like listeria.  The CDC recommends not eating the above cheeses while pregnant.  Always check the label before " eating soft cheese to be sure it's pasteurized. If you have any doubt (for example, if it's served at a party and you can't look at the package) don't eat it.  Cottage cheese, ricotta, cream cheese, mozzarella, processed cheese (such as American), and hard cheese (such as cheddar and Parmesan), as well as cultured dairy products like yogurt and buttermilk, are generally considered safe, either because they re made with pasteurized milk or because they're processed in ways that help inhibit the growth of the bacteria.    -  Deli meats: It is recommended that deli meats, lunch meats and hot dogs are all eaten hot (heated up to be steaming hot) and not at refrigerated or room temperatures.    -  Concerning symptoms: If you have any of the following things, contact me or my clinic: vaginal bleeding, cramping that won't go away, abdominal / belly pain, burning with urination, or anything other symptoms that concern you.    -  Medications in pregnancy: it is desired not to give medications during pregnancy, but many times we have to.  Some medications are safer than others.  All over the counter medications will tell you to ask your doctor about the medication if you are pregnant.  We will give you a list of medications that [facility] considers safe during pregnancy.  If the medication is on that list, you may say that you have asked your doctor.      EARLY TESTING OPTIONS TO DETERMINE THE RISK FOR CHROMOSOMAL ABNORMALITY   Testing Options:   We discussed the following options:               Non-invasive Prenatal Testing (NIPT)t ( [employer]) ,Please call your insurance always to determine the coverage     Maternal plasma cell-free DNA testing; first trimester ultrasound with nuchal translucency and nasal bone assessment is recommended, when appropriate    Screens for fetal trisomy 21, trisomy 13, trisomy 18, and sex chromosome aneuploidy    Cannot screen for open neural tube defects; maternal serum AFP after 15 weeks is  recommended                  Genetic Amniocentesis    Invasive procedure typically performed in the second trimester by which amniotic fluid is obtained for the purpose of chromosome analysis and/or other prenatal genetic analysis    Diagnostic results; >99% sensitivity for fetal chromosome abnormalities    AFAFP measurement tests for open neural tube defects           Comprehensive (Level II) ultrasound: Detailed ultrasound performed between 18-22 weeks gestation to screen for major birth defects and markers for aneuploidy.        We reviewed the benefits and limitations of this testing.  Screening tests provide a risk assessment specific to the pregnancy for certain fetal chromosome abnormalities, but cannot definitively diagnose or exclude a fetal chromosome abnormality.  Follow-up genetic counseling and consideration of diagnostic testing is recommended with any abnormal screening result.      Diagnostic tests carry inherent risks- including risk of miscarriage- that require careful consideration.  These tests can detect fetal chromosome abnormalities with greater than 99% certainty.  Results can be compromised by maternal cell contamination or mosaicism, and are limited by the resolution of cytogenetic G-banding technology.  There is no screening nor diagnostic test that can detect all forms of birth defects or mental disability.      This is optional testing and the decision to do this or not do this is different for everyone.  There is not one correct choice for everyone.  The right choice for you is the choice that best suits you.  I help guide a patient to make  right choice for her by reviewing her risk factors and her family history   For example, the risk for down syndrome increases with age, >35 higher risk than a 25 year old patient.   Annemarie May    HEALTHY PREGNANCY CARE: 6 to 10 WEEKS PREGNANT    Pregnancy is an important time for you to take care of yourself and your baby. There is much that you  can do through simple things like nutrition and exercise that will help you achieve the best outcome possible.     Learn about the changes you and your baby will experience during pregnancy. Your baby's facial features, brain, spinal cord and internal organs are developing, and baby's heart is pumping blood. Due to hormonal changes, you may notice nausea, fatigue or breast tenderness.    Common Discomforts of Early Pregnancy  Your body goes through many changes during pregnancy. Some are noticeable like increased breast size or darkening of the color of the nipple, but some changes may cause discomfort like breast tenderness, urinary frequency, fatigue or nausea. If you have questions about the duration or severity of what you are experiencing, contact your midwife or physician for guidance.     Coping with nausea/morning sickness    Sip small amounts of water, juices, or shakes. Try drinking between meals, not with meals.     Eat 5 or 6 small meals a day. Try dry toast, crackers, or cereal when you first get up, and eat breakfast a little later.    Make yesi tea (sliced yesi root in hot water with honey). Sip a cup in the morning before getting up.    Avoid spicy, greasy, and fatty foods.     When you feel sick, open your windows or go for a short walk to get fresh air.     Try nausea wristbands. These help some women.     Call your midwife or physician if you cannot keep fluids down, or if vomiting persists. There are medications that can help.    Choose healthy foods and gain the recommended amount of weight for your size. If you have questions or follow a special diet, talk with your midwife or physician. You should take one prenatal vitamin daily.  If nausea is a problem, try taking only a folic acid supplement of 400-800mcg daily until the nausea passes.    Follow safe guidelines for exercise. Low impact aerobic activities are generally okay during pregnancy. If you have a regular exercise routine, you  should be able to continue it during pregnancy as long as it doesn't cause pain. Talk to your midwife or physician about your activity at your prenatal visits.    You can sign up for a weekly parenting e-mail that gives support, tips and advice from health care professionals that starts with pregnancy and continues through the toddler years. To register, go to [email or web address] at any time during your pregnancy.    Things to Avoid During Pregnancy  A general principal to follow during pregnancy is to stay away from anything that is strong/bad smelling (gas, paint, fumes, etc), or known to cause problems for mom or baby    Smoking (self or others)    Alcohol     Pesticides    Caffeine     Soft cheeses    Fish with high mercury content (such as shark, swordfish, eric mackerel, or tilefish)    Some over the counter meds (ask your midwife or physician before taking)    Changing the baldo litter box    This is also a good time to think about genetic screening tests. These are tests done during pregnancy to look for possible problems with the baby. First trimester tests for Down's Syndrome, Trisomy 13 and 18 can be done as early as 10 weeks of pregnancy. Some testing can be done as late as 22 weeks of pregnancy, depending on the test. There are other tests that look for spinal defects, cystic fibrosis, Mati-Sachs disease. Talk with your midwife or physician about testing.    Warning Signs    Watch for warning signs, such as     vaginal bleeding    fluid leaking from your vagina    severe abdominal pain    nausea and vomiting more than 4-5 times a day, or if you are unable to keep anything down    fever more than 100.4 degrees F.     Contact your midwife or physician at [facility]OB at Phone: [phone] if you have these or any other concerns. If it's after clinic hours, physician patients should call the Care Connection at [phone] ([phone]); midwife patients should call their  answering service at [phone].    How can you care for yourself at home?   You can refer to the Starting Out Right book or find it online at [email or web address] or [email or web address]       Breastfeeding: a Healthy Option for You and Your Baby    The choice of how you will feed your baby is important.  Before your baby s birth, you ll want to learn about the benefits of breastfeeding.  [facility] have been designated Baby Friendly; an initiative that was created by the World Health Organization and UNICEF.  This helps give you and your baby the best start in feeding their baby.    Why should I breastfeed my baby?    Babies are less likely to develop childhood obesity or diabetes    Babies are less likely to suffer from recurrent ear infections    Babies are less likely to be hospitalized for respiratory conditions    Breast milk is rich in nutrients and antibodies-it is easy to digest    How does it benefit me?    Lowers the risk for diabetes, breast and ovarian cancer and postpartum depression    Moms can lose  baby weight  more quickly    Cost savings - formula can cost well over $1,500 per year    Convenient - no bottles and nipples to sterilize, no measuring and mixing formula    The physical contact with breastfeeding can make babies feel secure, warm and comforted     What about formula?  While you and your baby are staying with us at [facility], we will support whatever feeding choice you make for your baby.    Some important considerations:      The American Academy of Pediatrics, the World Health Organization, and many more organizations recommend exclusive breastfeeding for 6 months and continued breastfeeding while adding other foods for the first 1-2 years.      Any amount of breastmilk has benefits to both baby and  mother.    Giving formula in replacement of breastfeeding can affect mother s milk supply.  If formula is needed, hospital staff will work with you on a plan to help develop your milk supply.    Formula alters the natural growth of good bacteria in the  stomach.     Research has found that first time mothers who offer formula in the hospital have a shorter duration of breastfeeding.    How can I start to prepare?     Start by having a conversation with your medical provider.     Talk with your partner, family and friends.     Attend a prenatal class that includes breastfeeding preparation. Birth and breastfeeding classes are offered by [facility]. Visit [employer] for class information.     After your baby s birth, hospital staff and lactation consultants will help you and your baby get off to a great start with breastfeeding.    Resources:      [facility] clinic and hospital staff will support you throughout your pregnancy, delivery and beyond.  Visit [email or web address] for more details.        A free weekly pregnancy and parenting email is offered by [facility]. Emails include week by week information about your pregnancy, baby s development, breastfeeding and beyond.  Sign up at [email or web address].    [facility] [phone].  Consultants are available for assessment of your breastfeeding concerns, support and education.    [employer] helps mothers worldwide to breastfeed through mother-to-mother support, encouragement, information and education. [employer] promotes breastfeeding as an important element in the healthy development of baby and mother. Monthly classes, support groups and telephone help are offered in your community. To learn more visit [employer].    [facility]: [phone] ([phone])

## 2021-06-11 NOTE — PROGRESS NOTES
Subjective:      Karuna Ann is being seen today for her first obstetrical visit patient is new to our practice .  This is not a planned pregnancy. She is at Unknown gestation. Her obstetrical history is significant for advanced maternal age and obesity. Relationship with FOB: significant other, living together. Patient does intend to breast feed. Pregnancy history fully reviewed.  Daniel who is FOB new relationship, patient has 4 different FOB for her 4 kids age 14,9,6,3 yr old   Patient with complicated history of chronic back pain ,GDM,PTSD, SUSANA, asthma and recent Dx of COVID intrested in doing antibody testing   Also willing to do flu shot today  stuggling with nausea or vomiting and very tired , was seen in the ER in AUG 25 for left flank pain and was Dx'ed her pregnancy during that time I did able to review the records including ultrasound   US 20 osman living intrauterine gestation at 6 weeks 2 days, EDC 2021.    OB history updated     OB History    Para Term  AB Living   7 4 4   2 4   SAB TAB Ectopic Multiple Live Births   1 1     4      # Outcome Date GA Lbr Damien/2nd Weight Sex Delivery Anes PTL Lv   7 Current            6 Term 17 38w0d  8 lb 2.2 oz (3.692 kg) F  EPI N [facility]OVER   5 Term 14 40w0d  7 lb 12.8 oz (3.538 kg) F  EPI N [facility]      Birth Comments: System Generated. Please review and update pregnancy details.   4 SAB 12           3 TAB 05/15/11              Birth Comments: elective    2 Term 11/28/10 41w0d 18:00 6 lb 5 oz (2.863 kg) M    CLOVER   1 Term 06 40w0d 12:00 7 lb 7 oz (3.374 kg) M    CLOVER         Social History     Social History Narrative     Not on file       Active Ambulatory Problems     Diagnosis Date Noted     Asthma affecting pregnancy, antepartum 2016     Asthma 2019     Anemia of pregnancy in third trimester 2020     Back pain affecting pregnancy 2016     BMI 36.0-36.9,adult 2013     Chronic GERD 10/25/2019      Chronic low back pain 2013     Cyst of brain 10/25/2019     Hx of osteomyelitis 2013     Nausea and vomiting in pregnancy 2016     Patellofemoral pain syndrome of right knee 2018     Anxiety disorder 10/13/2017     Depression 10/13/2017     Mood disorder (H) 10/13/2017     PTSD (post-traumatic stress disorder) 10/13/2017     Screening for cervical cancer 2016     Type AB blood, Rh positive 2014     Morbid obesity (H) 2020     History of 2019 novel coronavirus disease (COVID-19) 2020     Resolved Ambulatory Problems     Diagnosis Date Noted     No Resolved Ambulatory Problems     Past Medical History:   Diagnosis Date     Mental disorder        The following portions of the patient's history were reviewed and updated as appropriate: allergies, current medications, past family history, past medical history, past social history, past surgical history and problem list.    Review of Systems  A 12 point comprehensive review of systems was negative except as noted.      Objective:     PHYSICAL EXAM  General: Alert, no acute distress.   EYES normocephalic conjunctivae are gloria,   EAR Normal pearly TMs bilaterally without erythema, pus or fluid  Nose is clear.  Oropharynx is moist and clear,   Neck: supple without adenopathy or thyromegaly.  Lungs: Good aeration bilaterally.Clear to auscultation without wheezes, rales or rhonci.    Heart: regular rate and rhythm, normal S1 and S2, no murmurs  Abdomen: soft and nontender, bowel sounds are present, no hepatosplenomegaly or mass palpable.  Skin: clear without rash or lesions  Neuro: normal muscle tone in all 4 extremities, deep tendon reflexes 2+ symmetrically at the patella     Assessment:     Karuna Ann   is at  Unknown  Gestational age , Estimated Date of Delivery: None noted. normal first initial visit no concerns     Karuna was seen today for initial prenatal visit.    Diagnoses and all orders for this  visit:    Supervision of high-risk pregnancy of elderly multigravida  -     ABO/RH Typing (OP order)  -     Hepatitis B Surface antigen (HBsAG)  -     HIV Antigen/Antibody Screening [facility]  -     HM1(CBC and Differential)  -     HML Antibody Screen  -     RPR  -     Rubella Immune Status (IgG)  -     Urinalysis Macroscopic  -     Culture, Urine  -     HM1 (CBC with Diff)  -     prenatal vit no.130-iron-folic (PRENATAL VITAMIN) 27 mg iron- 800 mcg Tab tablet; Take 1 tablet by mouth daily.    Morbid obesity (H)    Nausea and vomiting in pregnancy  -     ondansetron (ZOFRAN-ODT) 4 MG disintegrating tablet; Take 1 tablet (4 mg total) by mouth every 8 (eight) hours as needed for nausea.    PTSD (post-traumatic stress disorder)  -     AMB REFERRAL TO MENTAL HEALTH AND ADDICTION  - Adult (18+); Outpatient Treatment; Individual/Couples/Family/Group Therapy/Health Psychology; [facility]; [facility]; We will contact you to schedule the appointment or please c...    Mood disorder (H)  -     AMB REFERRAL TO MENTAL HEALTH AND ADDICTION  - Adult (18+); Outpatient Treatment; Individual/Couples/Family/Group Therapy/Health Psychology; [facility]; [facility]; We will contact you to schedule the appointment or please c...    History of 2019 novel coronavirus disease (COVID-19)  -     COVID-19 Virus (Coronavirus) Antibody & Titer Reflex; Future    Back pain affecting pregnancy in first trimester    Chronic bilateral low back pain without sciatica  -     Ambulatory referral to Physical Therapy    H/O gestational diabetes in prior pregnancy, currently pregnant  -     Glycosylated Hemoglobin A1c    Other orders  -     Influenza, Seasonal Quad, PF =/> 6months       Plan:      Initial labs drawn.  Prenatal vitamins started   Problem list reviewed and updated.  AFP3 discussed and also eduction provided for NIPT if covered by her insurance so adv to call them   Role of ultrasound in pregnancy  discussed; fetal survey will done at 20 wk  Amniocentesis discussed: not indicated.  Follow up in 4 weeks.    50% of 45 min visit spent on counseling and coordination of care.

## 2021-06-11 NOTE — TELEPHONE ENCOUNTER
Ok to override or wait till her current prescription for done     Annemarie May MD 9/3/2020 3:44 PM

## 2021-06-11 NOTE — TELEPHONE ENCOUNTER
Ondansetron(zofran) 4mg dissolvable not covered by insurance - no covered alternatives provided. Lalo- do you wish to complete a PA or attempt a different Rx?

## 2021-06-11 NOTE — TELEPHONE ENCOUNTER
Medication Question or Clarification  Who is calling: Patient  What medication are you calling about (include dose and sig)?: ondansetron (ZOFRAN-ODT) 4 MG disintegrating tablet  Who prescribed the medication?: Annemarie May  What is your question/concern?: Pharmacy needs prescription to be over ride. Unable to dispense more until this is done.  Please advise.  Requested Pharmacy: [facility]  Okay to leave a detailed message?: No

## 2021-06-11 NOTE — TELEPHONE ENCOUNTER
"The rx was previously signed as OTC so it did not send electronically to the pharmacy.  Rx is queued as \"Normal\" so it will go electronically to the pharmacy once signed.   "

## 2021-06-11 NOTE — TELEPHONE ENCOUNTER
Please figure out if tab or cap covered as we did dissolvable tab  For now I will send prescription for tabs    Annemarie May MD 9/15/2020 2:03 PM

## 2021-06-12 NOTE — TELEPHONE ENCOUNTER
I called and spoke to patient and patient's sister to tell them normal genetic screening results and gave the sex of the  baby to sister as they are doing a gender reveal.     Marline Bell, A

## 2021-06-12 NOTE — PROGRESS NOTES
[facility] Discharge Summary    Patient Name: Karuna Ann  Date: 11/5/2020  Referring provider: Annemarie May MD  Visit Diagnosis:   1. Chronic bilateral low back pain without sciatica     2. Generalized muscle weakness     3. First trimester pregnancy         Goals:  Pt. will demonstrate/verbalize independence in self-management of condition in : 6 weeks    Patient will decrease : ALLEGRA score;by _ points;for improved quality of function;in 6 weeks  by ___ points: 20% from original score  Pt will: be able to walk for errands and community mobility with increased ease and pain <5/10; in 6 weeks  Pt will: be able to sleep with increased ease and pain <5/10; in 6 weeks      Patient was seen for the initial evaluation and then did not return for any of her remaining follow-ups.  No additional objective information.  Goals not met.     Therapy will be discontinued at this time.  The patient will need a new referral to resume.    Thank you for your referral.  Ying Moreno  11/5/2020  4:59 PM

## 2021-06-12 NOTE — TELEPHONE ENCOUNTER
Question following Office Visit  When did you see your provider: 10/07/20  What is your question: Patient state she saw Annemarie Puentes on 10/07/20 . Provider ordered Genetic Screening test for her its been over a week she did not hear anything about Genetic Screening test results . Patient requested a call from clinic to discuss directly .  Okay to leave a detailed message: No

## 2021-06-12 NOTE — PROGRESS NOTES
Doing overall well   Asthma well controlled , does have seasonal allergies takes claritin recommend to add   flonase as needed   Like to do NIPT today covered or not covered   H/o GDM A1c 5.7 adv on diet and early 1 hr at 24 wk   Obesity will do growth ultrasound as needed   Follow up 4 wk will do single marker MFP for Neural tube defect    UTD on shots     Annemarie May MD 10/7/2020 3:51 PM

## 2021-06-12 NOTE — TELEPHONE ENCOUNTER
Left message to call back for: Karuna  Information to relay to patient:  Prenatal (Progenity Testing) results came back normal.     The sex of the baby, if she is interested in knowing, is a baby girl. Congrats!       Copy of results sent to scans and original sent to patient's home address.

## 2021-06-13 NOTE — TELEPHONE ENCOUNTER
Prior Auth request from [employer] for the Omeprazole 20 mg capsules.  Do you want to change script or start a prior auth    [employer]/ login    Key: [id number]  Last name: Seth  :  1985

## 2021-06-13 NOTE — TELEPHONE ENCOUNTER
Patient viewed this message via "[employer] Viewed by Karuna Ann on 12/7/2020  9:57 PM    Claudia Rea LPN

## 2021-06-13 NOTE — PROGRESS NOTES
Doing overall well , but  Since  1 wk she does have right upper quadrant pain for which she was seen in ER  Had ultrasound and labs done all came back in good range no risk for gall or renal stone  Patient does have a history of GERD which does flareup during pregnancy currently taking famotidine which is not helping  Most of the pain started right after eating  We will change her medication to Prilosec 20 mg twice a day for now  Covid swab done today again as she had fever for last couple days and also runny nose congestion patient does have a history of Covid in the past  Asthma well controlled , does have seasonal allergies takes claritin recommend to add   flonase as needed ,  First trimester screen came back negative and its a girl  H/o GDM A1c 5.7 adv on diet and early 1 hr at 24 wk   Obesity will do growth ultrasound as needed   Follow up 4 wk will do single marker MFP for Neural tube defect    UTD on laura Beckman was seen today for routine prenatal visit and fever.    Diagnoses and all orders for this visit:    Suspected COVID-19 virus infection  -     Symptomatic COVID-19 Virus (CORONAVIRUS) PCR    Supervision of high-risk pregnancy of elderly multigravida  -     Maternal Serum Screeen, AFP Single Marker  -     US OB > = 14 Weeks; Future    History of 2019 novel coronavirus disease (COVID-19)    Gastroesophageal reflux disease with esophagitis without hemorrhage  -     omeprazole (PRILOSEC) 20 MG capsule; Take 1 capsule (20 mg total) by mouth 2 (two) times a day before meals.      Annemarie May MD 11/13/2020 3:51 PM

## 2021-06-13 NOTE — TELEPHONE ENCOUNTER
----- Message from Annemarie May MD sent at 12/7/2020 12:44 PM CST -----  Dear Karuna,    It was a pleasure to see you in the office the other day.    I reviewed Your 20-week ultrasound result which showing healthy fetus at 21-week 1 day slight change in her due date on this ultrasound but will keep the due date to  4/18/2021.  Fetal survey was not completed we will do a limited OB ultrasound in next couple weeks to finish checking on fetal spine and left ventricle.    Please feel free to call back for any concerns or questions.    Thank you again for allowing me to be a part of your care!    Sincerely,      Annemarie May MD

## 2021-06-14 NOTE — TELEPHONE ENCOUNTER
1-27-21  Linda again Dr May,   Sorry to bother you again. But after further review of this I do need a new order for:  OBGYN  I unfortunately cant use the order that was placed on  1-14-21 because that has a DX attached of GDM Class A1.  The new order needs to be for surgical tubal.  The reason I need a new order with correct corresponding DX is because of insurance, im not sure if insurance  will accept a tubal with a DX of CDM A1  Thanks again for the time spent on this.  deirdre

## 2021-06-14 NOTE — TELEPHONE ENCOUNTER
Pt was seen by [facility] Diabetes educator. Rx's sent in for BG meter, supplies, and urine ketone strips.    Tessie Wilson RN, [facility] [place]

## 2021-06-14 NOTE — TELEPHONE ENCOUNTER
1-27-21  Yes, the last order was for diabetic care. This order would be for surgical at [facility]

## 2021-06-14 NOTE — TELEPHONE ENCOUNTER
1-27-21  Andialeena stopped in my office and is interested in a Tubal at delivery time.  Please place order to [facility]   Phone# [phone]  Fax# [phone]  Once order is placed pt can schedule a consult with the MARCIN mena

## 2021-06-14 NOTE — PROGRESS NOTES
Started diabetic educator not happy that she can't eat whatever she wants to eat  FBS  on higher day admit to eating ice cream and cookies   Last pregnancy she was diet control hoping to not use insulin  Diet education done again   hemoglobin low at 10.8 adv to take her  Prenatal vitamin+Fe  Daily ( as wasn't taking it)  7 ib wait gain so far, with morbid obesity will recommend 11-15 ib wt gain total   Starting growth ultrasound at 32 wk and will start NST at 34-36 wk  Continue antiacid as needed for gastroesophageal reflux disease   ultrasound in good range so far   NIPT and Neural tube defect  Screen normal    Annemarie May MD 1/27/2021 3:00 PM

## 2021-06-14 NOTE — PROGRESS NOTES
Called Patient and let her know that her  1 hr  Glucose tolerance test  Was 181 we decide not to do 3 hr   With known history of GDM and referred her to diabetic educator   Diet education done till then  hemoglobin low at 10.8 adv to take her  Prenatal vitamin+Fe  Daily ( as wasn't taking it)  Annemarie May MD 1/14/2021 3:00 PM

## 2021-06-14 NOTE — PROGRESS NOTES
Doing overall well   Patient does have a history of GERD which does flareup during pregnancy currently taking famotidine which is not helping  Most of the pain started right after eating  We will change her medication to Prilosec 20 mg twice a day for now  Asthma not well controlled as ran out of her inhalers , does have seasonal allergies takes claritin recommend to add   flonase as needed   First trimester screen came back negative and its a girl   H/o GDM A1c 5.7   Obesity will do growth ultrasound as needed   Follow up 4 wk will do single marker MFP for Neural tube defect    UTD on laura Beckman was seen today for routine prenatal visit.    Diagnoses and all orders for this visit:    Supervision of high-risk pregnancy of elderly multigravida  -     Glucose,Gestational Challenge (1 Hour); Future  -     Hemoglobin; Future  -     Syphilis Screen, [place]; Future  -     Ambulatory referral to Obstetrics / Gynecology    H/O gestational diabetes in prior pregnancy, currently pregnant  Not ready to do her 1 hr today will do lab only brian tomorrow   Chronic GERD  Continue prilosec as needed   Asthma affecting pregnancy, antepartum  -     beclomethasone (QVAR) 40 mcg/actuation inhaler; Inhale 2 puffs 2 (two) times a day.  -     albuterol (PROAIR HFA;PROVENTIL HFA;VENTOLIN HFA) 90 mcg/actuation inhaler; Inhale 2 puffs every 6 (six) hours as needed for wheezing.    Other orders  -     Tdap vaccine greater than or equal to 6yo IM; Future      Annemarie May MD 1/13/2021 3:51 PM

## 2021-06-15 NOTE — TREATMENT PLAN
[facility] OB Triage    Subjective: Karuna Ann is a  35 y.o. female at 34w0d with a current prenatal history significant for diet controlled GDM who presents to OB triage with back pain.  Fetal Movement: normal.    Estimated Date of Delivery: 21 Patient's last menstrual period was 2020.  OB provider: Annemarie May MD  OB History        7    Para   4    Term   4            AB   2    Living   4       SAB   1    TAB   1    Ectopic        Multiple        Live Births   4                 Objective:   Blood pressure 91/55, pulse 93, temperature 97.9  F (36.6  C), temperature source Oral, resp. rate 18, height 5' (1.524 m), weight (!) 228 lb (103.4 kg), last menstrual period 2020, not currently breastfeeding.  General:   alert, appears stated age and cooperative   Skin:   normal   HEENT:  PERRLA   Extremities: Warm, dry and well perfused. No edema.   Neuro: Reflexes 2+ and symmetric.    Abdomen: FHT present   Membranes intact   Fabrica:  None   FHT: Baseline: 140 bpm   Presentation: cephalic   Cervix: Examined by RN    Dilation: Closed     Laboratory Studies:   Results for orders placed or performed during the hospital encounter of 21   Urinalysis-UC if Indicated   Result Value Ref Range    Color, UA Straw Colorless, Yellow, Straw, Light Yellow    Clarity, UA Clear Clear    Glucose, UA Negative Negative    Bilirubin, UA Negative Negative    Ketones, UA Negative Negative    Specific Gravity, UA 1.005 1.001 - 1.030    Blood, UA Negative Negative    pH, UA 7.0 4.5 - 8.0    Protein, UA Negative Negative mg/dL    Urobilinogen, UA <2.0 E.U./dL <2.0 E.U./dL, 2.0 E.U./dL    Nitrite, UA Negative Negative    Leukocytes, UA Negative Negative        ASSESSMENT AND PLAN: Karuna Ann is a  35 y.o. female who presented to [facility] at 34w0d on 3/7/2021 Symptoms appear to improved with hot pack, conservative management. Patient felt well enough to  return home, will connect with Dr. May if any red flag symptoms come up, fevers, chills, blood in urine, burning with urination.  1. Not in labor.  2. No signs of UTI or Crystals on UA.      Rex Qiu MD PGY1 3/7/2021  [facility]

## 2021-06-15 NOTE — PROGRESS NOTES
Patient brought to room 2119 for evaluation of back pain.  SVE done cervix was closed high and thick.  EFM placed.  Patient states she thought she had a UTI earlier in the week.  A UA was sent.  Dr. Qiu in to see patient.

## 2021-06-15 NOTE — PATIENT INSTRUCTIONS - HE
Make brian for growth ultrasound in 2 wk   HEALTHY PREGNANCY CARE: 26-30 WEEKS PREGNANT    You are now in your last trimester of pregnancy. Your baby is growing rapidly, can open and close her eyelids, sometimes get hiccups, and you'll probably feel her moving around more often. Your baby has breathing movements and is gaining about one ounce each day. You may notice heartburn and leg cramps. Your back may ache as your body gets used to your baby's size and length.    Discuss your work situation with your midwife or physician as needed. If you stand for long periods of time, you may need to make changes and take breaks.    Pre-register online at the hospital where your baby will be born ([email or web address])     Be aware of your baby's activity level. You may be asked to do daily fetal movement counts. Contact your midwife or physician about any decreased movement.    You may have been tested for gestational diabetes today. If you are RH negative, you may have had an additional test and a Rhogam injection.    Consider receiving a Tdap vaccination to protect your baby from Pertussis/whooping cough.    If you are considering tubal ligation, discuss this with your midwife or physician. You will need to have an appointment with the obstetrician who will do the surgery to discuss the risks, benefits, and alternatives, and to sign the consent. This can be discussed at your next visit.    Continue to watch for any signs or symptoms of premature labor:     Regular contractions. This means having about 6 or more within 1 hour, even after you have had a glass of water and are resting.     A backache that starts and stops regularly.    An increase or change in vaginal discharge, such as heavy, mucus-like, watery, or bloody discharge.     Your water breaks or leaks.    Continue to watch for signs and symptoms of preeclampsia:     Sudden swelling of your face, hands, or feet      New vision problems such as blurring, double vision, or flashing lights    A severe headache not relieved with acetaminophen (Tylenol)    Sharp or stabbing pain in your right or middle upper abdomen    If you have any of the above symptoms or any other concerns, call your midwife or physician or their clinic staff at [facility] at Phone: [phone]. If it's after clinic hours, physician patients should call the Care Connection at [phone] ([phone]); midwife patients should call their answering service at [phone].    How can you care for yourself at home?   You can refer to the Starting Out Right book or find it online at http://www.[employer].org/images/stories/maternity/[facility]-Starting-Out-Right.pdf or http://www.[employer].org/images/stories/flipbooks/[facility]-starting-out-right/[facility]-starting-out-right.html#p=8     You can sign up for a weekly parenting e-mail that gives support, tips and advice from health care professionals that starts with pregnancy and continues through the toddler years. To register, go to [email or web address].[employer].org/baby at any time during your pregnancy.      Baby Feeding in the Hospital: Information, Support and Resources    As you prepare for the birth of your child, you will want to consider options for feeding your baby including breast-feeding and/or baby formula. The American Academy of Pediatrics recommends exclusive breast-feeding for the first six months (although any amount of breast-feeding is beneficial).  However, we also understand that breast-feeding is a personal choice and not for everyone. Whether or not you choose to breast-feed, your decision will be respected by our staff.    There are numerous benefits of breast-feeding; here are a few to consider:    Provides antibodies to protect your baby from infections and diseases    The cost: formula can cost over $1,500 per year    Convenience, no warming up or sterilizing bottles  and supplies    The physical contact with breastfeeding can make babies feel secure, warm and comforted    What ever my feeding choice, what can I expect after I deliver my baby?    Your baby will usually be placed skin-to-skin immediately following birth. The skin to skin contact between you and your baby will be a special and memorable time. The bonding and attachment comforts your baby and has a positive effect on baby s brain development.     Having your baby  room in  with you also helps you start to learn your baby s body rhythms and sleep cycle.      You will also begin to learn your baby s cues (signals) that he or she is ready to feed.    When do I start to feed my baby?  As soon as possible after your baby s birth, you will be encouraged to begin feeding.  In the first couple of weeks, your baby will eat often.  Breastfeeding babies usually eat at least 8 times in 24 hours.  Babies fed formula usually eat at least 7 times in 24 hours.      Breast-feeding tips:    Get comfortable and use pillows for support.    Have your baby at the level of your breast, facing you,  tummy to tummy .      Touch your nipple to your baby s lips so you baby s mouth opens wide (rooting reflex).  Aim the nipple toward the roof of your baby s mouth. When your baby opens his or her mouth, pull your baby toward your breast to help your baby  latch on  to your nipple and much of the areola area.    Hand expressing your breast milk can assist with latching your baby to your breast, if needed.    Ask for help, breastfeeding may seem awkward or uncomfortable at first, this is normal. There are numerous resources available at [facility] and beyond.     If your goal is to exclusively breastfeed, avoid using any formula or artificial nipples (including bottles and pacifiers) while you are your baby are learning to breastfeed unless there is a medical reason.       Mixing breastfeeding and formula can interfere with how  you begin building your milk supply.  It can impact how you and your baby  learn  to breastfeeding together and alter the natural growth of  good  bacteria in your baby s stomach.    Delay a pacifier or a bottle in the first few weeks until breastfeeding is well established. This is often around 3 weeks of age.    Ask your nurse to show you how to hand express.   Breast milk can be kept in the refrigerator or freezer for later use.  (over)  Hospital Resources:  [facility] Lactation Clinics located at [facility], [facility] and [facility]  Call: [phone].    Inpatient support    Outpatient appointments    Telephone consultation    Breast-feeding classes available through [employer]      Online Resources:    [employer][email or web address]/baby sign up for free online weekly e-mail    [employer]/maternity    Breastfeedingmadesimple.com    [employer] (La Leche League)    Normalfed.com    Womenshealth.gov/breastfeeding    Workandpump.com    Breast-feeding Supplies & Pumps:  Talk to your insurance provider or WIC (Women, Infants and Children) to learn more about options available to you. Recent health insurance changes may include additional coverage for supplies and pumps.    Public Health:  Women, Infants and Children Nutrition program (WIC): provides breast-feeding support and education in addition to formal feeding moms. 681-CVB-3819 or http://www.health.[facility].mn.us/divs/fh/wic    Family Health Home Visiting: Public [facility] Nurse home visits are available. Talk to your provider to see if you qualify. Most [facility] have a program available.    Additional Resources:  La Leche League is an international, nonprofit, nonsectarian organization offering information, education, and support to mothers who want to breast-feed their babies. Local groups offer phone help and monthly meetings. Visit [employer] or [employer].org and us the  Find local support  drop down menu or click on the   Resources  tab.    [place] Breastfeeding Resources: 8-059-113-BABY (2353) toll free    National Breastfeeding Help Line trained breastfeeding peer counselors can help answer common breast-feeding questions by phone. Monday-Friday: English/Kiswahili  3-771- [phone] toll free, 1-173.845.9951 (TTY)    [facility] Care Connection: [phone]-[phone] ([phone])

## 2021-06-15 NOTE — TELEPHONE ENCOUNTER
Left message to call back for: patient  Information to relay to patient:    Left 3 messages for pt in regards to her video visit. Pt will need to reschedule or go to urgent care.     Pt will need to leave a urine sample anyways if having urine sx, so most likely an office visit might be better.
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
## 2021-06-15 NOTE — PROGRESS NOTES
Did talked with  diabetic educator. FBS  on higher day admit to eating ice cream and cookies but changing to sugar free  Eat her dinner at 6:30 but then eat snack before dinner , adv on high protein snacks   Did miss her diabetic educator brian will be calling soon  Last pregnancy she was diet control hoping to not use insulin  Diet education provided today is to keep missing diabetic educator appointment  POSTPARTUM TUBAL LIGATION consent signed   hemoglobin low at 10.8 adv to take her  Prenatal vitamin+Fe  Daily ( as wasn't taking it)  8 ib wait gain so far, with morbid obesity will recommend 11-15 ib wt gain total   Starting BPP and  growth ultrasound at 32 ,34,36 wk and will start NST at 34-36 wk  C/o some [place] Rinaldi contractions    Continue antiacid as needed for gastroesophageal reflux disease   NIPT and Neural tube defect  Screen normal     Annemarie May MD 2/26/2021

## 2021-06-15 NOTE — TELEPHONE ENCOUNTER
Left message to call back for: pt   Information to relay to patient:  Please assist in scheduling if pt chooses to

## 2021-06-15 NOTE — PROGRESS NOTES
Patient stated she felt much better after warm pack to back and resting.  Category I tracing.  Discharge orders given.  Patient discharged to home.

## 2021-06-15 NOTE — PROGRESS NOTES
Started diabetic educator not happy that she can't eat whatever she wants to eat  FBS  on higher day admit to eating ice cream and cookies but changing to sugar free  Did miss her diabetic educator brian will be calling soon  Last pregnancy she was diet control hoping to not use insulin  Diet education done again   Does have brian to talk to OB for POSTPARTUM TUBAL LIGATION next wk  hemoglobin low at 10.8 adv to take her  Prenatal vitamin+Fe  Daily ( as wasn't taking it)  8 ib wait gain so far, with morbid obesity will recommend 11-15 ib wt gain total   Starting BPP and  growth ultrasound at 32 ,34,36 wk and will start NST at 34-36 wk  Continue antiacid as needed for gastroesophageal reflux disease  NIPT and Neural tube defect  Screen normal     Annemarie May MD 2/10/2021 3:00 PM

## 2021-06-16 PROBLEM — O24.414 GESTATIONAL DIABETES MELLITUS (GDM) REQUIRING INSULIN: Status: ACTIVE | Noted: 2021-02-10

## 2021-06-16 PROBLEM — O99.013 ANEMIA OF PREGNANCY IN THIRD TRIMESTER: Status: ACTIVE | Noted: 2020-07-09

## 2021-06-16 PROBLEM — J45.30 MILD PERSISTENT ASTHMA WITHOUT COMPLICATION: Status: ACTIVE | Noted: 2019-09-19

## 2021-06-16 PROBLEM — M22.2X1 PATELLOFEMORAL PAIN SYNDROME OF RIGHT KNEE: Status: ACTIVE | Noted: 2018-04-12

## 2021-06-16 PROBLEM — O09.529 SUPERVISION OF HIGH-RISK PREGNANCY OF ELDERLY MULTIGRAVIDA: Status: ACTIVE | Noted: 2020-09-03

## 2021-06-16 PROBLEM — G93.0 CYST OF BRAIN: Status: ACTIVE | Noted: 2019-10-25

## 2021-06-16 PROBLEM — F39 MOOD DISORDER (H): Status: ACTIVE | Noted: 2017-10-13

## 2021-06-16 PROBLEM — E66.01 MORBID OBESITY (H): Status: ACTIVE | Noted: 2020-09-03

## 2021-06-16 PROBLEM — F32.A DEPRESSION: Status: ACTIVE | Noted: 2017-10-13

## 2021-06-16 PROBLEM — K21.9 CHRONIC GERD: Status: ACTIVE | Noted: 2019-10-25

## 2021-06-16 PROBLEM — Z86.16 HISTORY OF 2019 NOVEL CORONAVIRUS DISEASE (COVID-19): Status: ACTIVE | Noted: 2020-09-03

## 2021-06-16 PROBLEM — F43.10 PTSD (POST-TRAUMATIC STRESS DISORDER): Status: ACTIVE | Noted: 2017-10-13

## 2021-06-16 NOTE — TELEPHONE ENCOUNTER
Incoming fax from pharmacy for Omeprazole 20 MG capsules.      Please initiate PA process.    [phone]  ID# [id number]    Mayelin Vázquez

## 2021-06-16 NOTE — ANESTHESIA PROCEDURE NOTES
Epidural Block    Patient location during procedure: OB    Reason for Block:labor epidural  Staffing:  Performing  Anesthesiologist: Janet Vargas MD  Preanesthetic Checklist  Completed: patient identified, risks, benefits, and alternatives discussed, timeout performed, consent obtained, airway assessed, oxygen available, suction available, emergency drugs available and hand hygiene performed  Procedure  Patient position: sitting  Prep: ChloraPrep  Patient monitoring: continuous pulse oximetry, heart rate and blood pressure  Approach: midline  Location: L3-L4  Injection technique: TOMÁS saline  Number of Attempts:1  Needle  Needle type: [employer]   Needle gauge: 18 G     Catheter in Space: 3  Assessment  Sensory level:  No complications      Additional Notes:  Tolerated well, easily placed.

## 2021-06-16 NOTE — TELEPHONE ENCOUNTER
Central PA team  [phone]  Pool: [facility] PA MED ([id number])          PA has been initiated.       PA form completed and faxed insurance via Cover My Meds     Key:  [id number]     Medication:  Omeprazole 20MG dr capsules    Insurance:  [employer] [place]        Response will be received via fax and may take up to 5-10 business days depending on plan

## 2021-06-16 NOTE — PROGRESS NOTES
Did talked with  diabetic educator. FBS  no contact with diabetic educator / referral done again today will start lantus at 10 units  today at bedtime   Eat her dinner at 6:30 but then eat snack before dinner , adv on high protein snacks   Last pregnancy she was diet control   Diet education provided today is to keep missing diabetic educator appointment  POSTPARTUM TUBAL LIGATION consent signed   hemoglobin low at 10.8 adv to take her  Prenatal vitamin+Fe  Daily ( as wasn't taking it)  8 ib wait gain so far, with morbid obesity will recommend 11-15 ib wt gain total   Starting BPP and  growth ultrasound at 32 ,34,36 wk and will start NST at 34-36W  Plan induction at 38 wk  C/o some [place] Rinaldi contractions    Continue antiacid as needed for gastroesophageal reflux disease   GBS done today    Annemarie May MD 3/18/2021

## 2021-06-16 NOTE — ANESTHESIA POSTPROCEDURE EVALUATION
Patient: Karuna Ann  * No procedures listed *  Anesthesia type: epidural    Patient location: Labor and Delivery  Last vitals: No vitals data found for the desired time range.    Post vital signs: stable  Level of consciousness: awake and responds to simple questions  Post-anesthesia pain: pain controlled  Post-anesthesia nausea and vomiting: no  Pulmonary: unassisted, return to baseline  Cardiovascular: stable and blood pressure at baseline  Hydration: adequate  Anesthetic events: no    QCDR Measures:  ASA# 11 - Genia-op Cardiac Arrest: ASA11B - Patient did NOT experience unanticipated cardiac arrest  ASA# 12 - Genia-op Mortality Rate: ASA12B - Patient did NOT die  ASA# 13 - PACU Re-Intubation Rate: NA - No ETT / LMA used for case  ASA# 10 - Composite Anes Safety: ASA10A - No serious adverse event    Additional Notes:  
no

## 2021-06-16 NOTE — PATIENT INSTRUCTIONS - HE
Instructions for the day your induction is scheduled:    Socorro Beckman    Your induction is scheduled for  4/5/21  call labor and delivery at [facility]  . Please call at # [phone] for [facility]      The charge nurse will assess the number of patients on Labor & Delivery at the time you call.  Depending on how many patients are there, the charge nurse will then tell you whether you are able to go to Labor & Delivery at that time, whether you should arrive later in the day, or whether you should call back later.    Sometimes Labor & Delivery is full and your induction has to be delayed until the following day.      We plan to do ripening of the cervix  with cytotec tab orally every 2 hr for 12 doses, most of the patient can go in labor just by oral meds but not and as  Cervix get  Favorable( soft and dilated) then we start the medication ( pitocin ) to bring the contractions , all based on exams and how you do with both medications , I won't be in the hospital  at the time you present to the hospital but they call me for the orders and instruction and they update me how you doing on constant basis , I usually come when you close to the delivery or if some thing else need to be addressed  Early  labor managed by the our very skilled nurses and residents under our supervision , if you have any questions regarding this process please feel free to let me know .    Annemarie May MD

## 2021-06-16 NOTE — TELEPHONE ENCOUNTER
[facility]  Provider Annemarie May MD    :   N/A    Language:   English    Discharge Follow-Up:  Follow-Up call by Outreach nurse: Call placed    Type of Delivery:      Feeding Method:  Formula    Feedings in 24Hrs:  >8x/Day    Breast engorgement:   No    Nipple tenderness:   No    Non-nursing engorgement discussed:   Yes    Amount of Feedin-2 oz    Number of Infant Stools in 24 hours:   >3    Stool:  Transition    Number of Infant voids in 24 hours:  >3    Urine:  Clear    Infant Jaundice:   Facial    Discussed increasing s/s of Jaundice:   Yes    Circumcision:   N/A    Maternal s/s of infection:   None    Maternal s/s of PIH:   WDL    Postpartum cramping:   Mild    Comfort:  Adequate with routine pain meds    Vaginal Bleeding:  WDL    S/S of Maternal Thrombosis:  None    Maternal BM Since Delivery:  Yes    Referrals:   None    Resources Used During Phone Call:  HEPS    Comments:   Pt states that things are going well at home. NB is cristina feeding well, voiding/stooling, no concerns with jaundice. Laketa states she is feeling/doing well at home. Denies any other questions or concerns at this time. NB follow-up tomorrow with Dr May. Edu discussed. Questions answered.    Completed by:   Agata Villegas RN

## 2021-06-16 NOTE — PROGRESS NOTES
Did talked with  diabetic educator. FBS 95-98  Working with  diabetic educator  Increase antus  12 units Last pregnancy she was diet control   Diet education provided today is to keep missing diabetic educator appointment  POSTPARTUM TUBAL LIGATION consent signed   hemoglobin low at 10.8 adv to take her  Prenatal vitamin+Fe  Daily ( as wasn't taking it)  Had  BPP and  growth ultrasound  Done yesterday  8/8  NST  Done today reactive cat 1  Plan induction at 38 wk  C/o some [place] Rinaldi contractions    Continue antiacid as needed for gastroesophageal reflux disease   GBS negative .Follow up weekly till delivery     Annemarie May MD 3/24/2021

## 2021-06-16 NOTE — TELEPHONE ENCOUNTER
Prior Authorization Request  Who s requesting:  Pharmacy  Pharmacy Name and Location: [address] [address]  Medication Name: omeprazole (PRILOSEC) 20 MG capsule       Insurance Plan:    Insurance Member ID Number:     CoverMyMeds Key: N/A  Informed patient that prior authorizations can take up to 10 business days for response:   No  Okay to leave a detailed message: No    Plan does NOT cover more than 1 pill per day.  I capsule per day is the MAXIUM DAILY DOSE

## 2021-06-16 NOTE — ANESTHESIA PREPROCEDURE EVALUATION
Anesthesia Evaluation        Airway   Mallampati: II   Pulmonary    (+) asthma                           Cardiovascular    Neuro/Psych      Endo/Other    (+) diabetes mellitus well controlled using insulin, obesity, pregnant     GI/Hepatic/Renal    (+) GERD,             Dental                         Anesthesia Plan  Planned anesthetic: epidural    ASA 3     Anesthetic plan and risks discussed with: patient    Post-op plan: epidural analgesia

## 2021-06-16 NOTE — PROGRESS NOTES
Blood sugars in good range continue lantus at  12 units   Last pregnancy she was diet control   POSTPARTUM TUBAL LIGATION consent signed   hemoglobin low at 10.8 adv to take her  Prenatal vitamin+Fe  Daily ( as wasn't taking it)  Had  BPP and  growth ultrasound  Done 3/23   8/8   EFW 3300 gm   NST  Done today reactive cat 1  Plan induction at 38 wk scheduled on morning of 4/5/21  C/o some [place] Rinaldi contractions    Continue antiacid as needed for gastroesophageal reflux disease   GBS negative .Follow up weekly till delivery     Karuna was seen today for routine prenatal visit.    Diagnoses and all orders for this visit:    Supervision of high-risk pregnancy of elderly multigravida  -     Fetal nonstress test    Gestational diabetes mellitus (GDM) requiring insulin  Comments:  well controlled   Orders:  -     Fetal nonstress test    BMI 40.0-44.9, adult (H)    Anemia of pregnancy in third trimester  Comments:  taking her vitamins     Asthma affecting pregnancy, antepartum  Comments:  jacquelyn May MD 3/31/2021
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
## 2021-06-17 NOTE — TELEPHONE ENCOUNTER
Telephone Encounter by Aparna Cornejo at 9/8/2020  4:37 PM     Author: Aparna Cornejo Service: -- Author Type: --    Filed: 9/8/2020  4:37 PM Encounter Date: 9/8/2020 Status: Signed    : Aparna Cornejo APPROVED:    Approval start date: 6/8/2020  Approval end date:  9/8/2021    Pharmacy has been notified of approval and will contact patient when medication is ready for pickup.

## 2021-06-17 NOTE — TELEPHONE ENCOUNTER
Telephone Encounter by Lady Vazquez at 4/5/2021 10:13 AM     Author: Lady Vazquez Service: -- Author Type: --    Filed: 4/5/2021 10:14 AM Encounter Date: 3/30/2021 Status: Signed    : Lady Vazquez APPROVED:    Approval start date: 03/01/2021  Approval end date:  07/31/2021    Pharmacy has been notified of approval and will contact patient when medication is ready for pickup.

## 2021-06-18 NOTE — PATIENT INSTRUCTIONS - HE
Patient Instructions by Annemarie May MD at 5/20/2021  2:20 PM     Author: Annemarie May MD Service: -- Author Type: Physician    Filed: 5/20/2021  2:32 PM Encounter Date: 5/20/2021 Status: Addendum    : Annemarie May MD (Physician)    Related Notes: Original Note by Annemarie May MD (Physician) filed at 5/20/2021  1:54 PM         Patient Education     Mirena Intrauterine System 52 mg (0.826628 MG/HR)  Uses  This medicine is used for the following purposes:    birth control    menstrual bleeding  Instructions  A negative pregnancy test may be needed before receiving this medicine.  A second form of birth control may be needed for the first week after the paola is placed. Ask your doctor or pharmacist about the need for back-up birth control.  Please tell your doctor and pharmacist about all the medicines you take. Include both prescription and over-the-counter medicines. Also tell them about any vitamins, herbal medicines, or anything else you take for your health.  The IUD should be removed after 5 years. After removal, a new IUD can be inserted if treatment is to be continued.  It is very important that you keep all appointments for medical exams and tests while on this medicine.  Cautions  Some patients taking this medicine have experienced serious side effects. Please speak with your doctor to understand the risks and benefits associated with this medicine.  Tell your doctor and pharmacist if you ever had an allergic reaction to a medicine. Symptoms of an allergic reaction can include trouble breathing, skin rash, itching, swelling, or severe dizziness.  Ask your doctor to show you how to perform a self breast examination. You should check your breasts once a month and report any changes to your doctor.  Talk to your doctor about getting a complete physical exam every year while on this medicine.  Check regularly to make sure the IUD is in the proper place. Contact your doctor right away if the IUD comes out  or if you can not feel the threads.  Tell the doctor or pharmacist if you are pregnant, planning to be pregnant, or breastfeeding.  Do not use this medicine if you are pregnant. If you become pregnant while on this medicine, contact your doctor immediately.  This medicine does not protect you or your partner against sexually transmitted diseases.  Ask your pharmacist if this medicine can interact with any of your other medicines. Be sure to tell them about all the medicines you take.  Please tell all your doctors and dentists that you are on this medicine before they provide care.  Do not start or stop any other medicines without first speaking to your doctor or pharmacist.  Side Effects  The following is a list of some common side effects from this medicine. Please speak with your doctor about what you should do if you experience these or other side effects.    breast pain or swelling    nausea    cramping of the uterus or bleeding from the vagina    vaginal bleeding or spotting between periods    weight gain  Call your doctor or get medical help right away if you notice any of these more serious side effects:    severe abdominal or pelvic pain    breast lumps    depression or feeling sad    fever or chills    severe or persistent headache    mood changes    pain during sexual intercourse    dark urine    vaginal itching or discharge    severe or persistent vomiting    yellowing of the eyes    yellowing of the skin  A few people may have an allergic reactions to this medicine. Symptoms can include difficulty breathing, skin rash, itching, swelling, or severe dizziness. If you notice any of these symptoms, seek medical help quickly.  Extra  Please speak with your doctor, nurse, or pharmacist if you have any questions about this medicine.  [email or web address]  IMPORTANT NOTE: This document tells you briefly how to take your medicine, but it does not tell you all there is to know about  it.Your doctor or pharmacist may give you other documents about your medicine. Please talk to them if you have any questions.Always follow their advice. There is a more complete description of this medicine available in English.Scan this code on your smartphone or tablet or use the web address below. You can also ask your pharmacist for a printout. If you have any questions, please ask your pharmacist.     2021 [employer].           Patient Education     Birth Control: IUD (Intrauterine Device)    The IUD (intrauterine device) is small, flexible, and T-shaped. A trained healthcare provider places it in the uterus. The IUD is one of the most effective birth control methods. It is also reversible. This means it can be removed at any time by a trained healthcare provider. New IUDs are safe and do not have the risks of older types of IUDs.  Pregnancy rates  Talk to your healthcare provider about the effectiveness of this birth control method.  Types of IUDs  IUD insertion is done in the healthcare providers office. Two types of IUDs are available:    The copper IUD releases a small amount of copper into the uterus. The copper makes it harder for sperm to reach the egg. The device works for at least 10 years.    The progestin IUD releases a hormone called progestin. It causes changes in the uterus to help prevent pregnancy. The device works for 3 to 5 years, depending on which device is chosen. It may be recommended for women who have anemia or heavy and painful periods.  IUDs have thin strings that hang from the opening of the uterus into the vagina. This lets you check that the IUD stays in place.  Things to know about IUDs    IUDs can be used by women who have never been pregnant or by women with a history of sexually transmitted infections (STIs) or tubal pregnancy.    It won't move from the uterus to any other part of the body.    There is a slight risk of the device coming out of the vagina  (expulsion).    It may not work in women who have an abnormally shaped uterus.    A copper IUD may cause heavier periods and cramping.    Progestin IUD may cause light periods or no periods at all (irregular bleeding or spotting is possible and normal during first 3 to 6 months).    If you get a sexually transmitted infection with an IUD in place, symptoms may be more severe.  When to call your healthcare provider  Be sure your healthcare provider knows if you have:    A sexually transmitted infection (STI) or possible STI    Liver problems    Blood clots (for progestin IUD only)    Breast cancer or a history of breast cancer (progestin IUD only)   Date Last Reviewed: 3/1/2017    7060-3168 [employer]. [address] [place] [address], [place] [id number]. All rights reserved. This information is not intended as a substitute for professional medical care. Always follow your healthcare professional's instructions.           Patient Education     Understanding Postpartum Depression  Youve just had a baby. You expected to be excited and happy. But instead you find yourself crying for no reason. You may have trouble coping with your daily tasks. You feel sad, tired, and hopeless most of the time. You may even feel ashamed or guilty. But what youre going through is not your fault and you can feel better. Talk to your healthcare provider. He or she can help.    What is depression?  Depression is a mood disorder that affects the way you think and feel. The most common symptom is a feeling of deep sadness. You may also feel as if you just cant cope with life. Other symptoms include:    Gaining or losing a lot of weight    Sleeping too much or too little    Feeling tired all the time    Feeling restless    Crying a lot    Having too little or too much appetite.    Withdrawing from friends and family    Having headaches, aches and pains, or stomach problems that won't go away.    Fears of harming your baby    Lack of  "interest in your baby    Feeling worthless or guilty    No longer finding pleasure in things you used to    Having trouble thinking clearly or making decisions    Thinking about death or suicide  Depression after childbirth  You may be weepy and tired right after giving birth. These feelings are normal. Theyre sometimes called the baby blues. These blues go away after 1 to 2 weeks. However, postpartum (meaning after birth) depression lasts much longer and is more severe than the \"baby blues.\" It can make you feel sad and hopeless. You may also fear that your baby will be harmed and worry about being a bad mother.  What causes postpartum depression?  The exact cause of postpartum depression is unknown. Changes in brain chemistry or structure are believed to play a big role in depression. It may be due to changes in your hormones during and after childbirth. You may also be tired from caring for your baby and adjusting to being a mother. All these factors may make you feel depressed. In some cases, your genes may also play a role.  Depression can be treated  There are many ways to treat postpartum depression. Talking to your healthcare provider is the first step toward feeling better.  When to call your healthcare provider  Call your healthcare provider if you:     Cry for no clear reason    Have trouble sleeping, eating, and making choices    Questions whether you can handle caring for a baby    Have intense feelings of sadness, anxiety, or despair that prevent you from being able to do your daily tasks  Resources    National [place] of Mental Xtoooh318-896-2860vgy.[facility].nih.gov    National [place] on Mental Qsqplcn008-343-9724jmf.[place].org    Mental Health Pitafpl178-234-4377lhn.[facility].org    National Suicide Ueiwcvb195-652-9281 (800-SUICIDE)    Date Last Reviewed: 7/1/2017 2000-2019 [employer]. [address] [facility], [place] [address]. All rights reserved. This information is not intended as a " substitute for professional medical care. Always follow your healthcare professional's instructions.

## 2021-06-18 NOTE — PATIENT INSTRUCTIONS - HE
Patient Instructions by Annemarie May MD at 1/13/2021 11:20 AM     Author: Annemarie May MD Service: -- Author Type: Physician    Filed: 1/13/2021  2:09 PM Encounter Date: 1/13/2021 Status: Signed    : Annemarie May MD (Physician)         Patient Education     Controlling Asthma Triggers: Irritants  Irritants are things in the air that can trigger symptoms in some people with asthma or COPD. Below are some common irritants. Some are tiny particles and others are dissolved in the air. You will also find tips to help you stay away from them.  Smoke  This is from cigarettes, cigars, pipes, barbecues, outdoor campfires, and fireplaces.    Dont smoke. And dont let people smoke in your home or car.    When you travel, ask for smoke-free rental cars and hotel rooms.    Stay away from fireplaces and wood stoves. If you cant, sit away from them. Make sure the smoke goes outside.    Dont burn incense or use candles.    Move away from smoky outdoor cooking grills.  Smog  This is from car exhaust and other pollution.    Read or listen to local air quality reports. These let you know when air quality is poor.    Stay indoors as much as you can on smoggy days. If possible, use air conditioning instead of opening the windows. Air conditioners filter the air. The filter needs to be cleaned regularly.    In your car, set air conditioning to use air only inside the car. This will let in less pollution.  Strong odors  These are from air fresheners, deodorizers, and cleaning products. They are also from perfumes, deodorants, and other beauty products. Strong odors also come from incense and candles, and insect sprays and other sprays. The chlorine in swimming pools can affect some people.    Use products with no scent. An example is scent-free deodorant or body lotion.    Don't use products with bleach and ammonia for cleaning. Try making a cleaning solution with white vinegar, baking soda, or mild dish soap.    Use exhaust fans  while cooking. Or open a window if you can.    Don't use perfumes, air fresheners, potpourri, and other scented products.  Other irritants  These are dust, aerosol sprays, and fine powders.    Wear a mask while doing tasks such as sanding, dusting, sweeping, and yardwork. Make sure any indoor work area is well-ventilated. Open doors and windows when doing these tasks.    Use pump spray bottles instead of aerosols.    Pour liquid  instead of spraying them. For example, don't spray a window with . Pour some on a rag or cloth instead.   Quick-relief inhalers  If you have a quick-relief inhaler, carry it with you at all times. If you cant stay away from an area with irritants, watch for symptoms. If you have symptoms, leave the area and use your quick-relief inhaler as directed. Always keep your asthma action plan updated and with you to control your asthma.  Date Last Reviewed: 10/1/2016    3113-7282 [employer]. [address] [place] [address]. All rights reserved. This information is not intended as a substitute for professional medical care. Always follow your healthcare professional's instructions.           Patient Education     Asthma and Pregnancy  Now that youre pregnant, you may be concerned about how asthma will affect your health and the health of your baby. But asthma doesnt have to stop you from having a healthy pregnancy. Managing your asthma can keep you and your baby healthy.     During your pregnancy, work with your healthcare provider to keep your asthma under good control.    Why managing asthma is important during pregnancy  When youre pregnant and have an asthma flare-up, it affects both you and your baby. The baby gets oxygen from your blood to grow and develop normally. Severe asthma can cause problems getting oxygen to your baby. When asthma isnt controlled, problems that can develop include:    Baby being born too early (prematurity)    Need to deliver by      Baby being smaller than normal    High blood pressure and/or preeclampsia in the mother  Work with your healthcare providers to manage your asthma  You likely have a healthcare provider (HCP) who helps you manage your asthma. During your pregnancy, continue to see this HCP regularly. He or she can continue to monitor your asthma. And medicines can be adjusted as needed. Be sure that this HCP is in contact with the HCP who is caring for your pregnancy. Also be sure both providers know what asthma medicines you take. If you dont have an HCP taking care of your asthma, tell the provider who cares for your pregnancy.  Prevent flare-ups  Here are tips to prevent flare-ups:    Continue using asthma medicines as prescribed.  Follow your HCPs instructions about using asthma medicines. These will likely be inhaled medicines. These have little or no chance of harming you or your baby.      Monitor your lung function. Lung function tests help measure how well your lungs are working. The test results tell you and your providers whether you are getting enough oxygen. You may be tested at your providers office or at a hospital. You may also be instructed to monitor yourself at home. This is done using a peak flow meter. Your provider will tell you when and how often you need to use the meter.     Control asthma triggers. These are things that cause your airways to react and lead to an asthma attack (flare-up). Triggers can include smoke, scents, and chemicals. They also include allergies to things like pollen, pets, and dust mites. A flare-up can also be triggered by exercise and changes in the weather. Having a cold or the flu can also trigger a flare-up. To prevent the flu, get a flu shot. If youve been getting allergy shots, you should continue to do so. However, you should not get allergy shots for the first time when youre pregnant.  Monitor the health of your baby  Your HCP will monitor your babys health  closely during your pregnancy. If your asthma is not well controlled, this becomes even more important. So be sure to keep all your prenatal appointments. Monitoring includes:    Regular ultrasound tests. Ultrasound is a safe test that allows you and your HCP to see an image of your baby in the womb. The ultrasound shows your babys development, including whether the babys organs are growing normally.    Fetal nonstress test. This test may be done when you are around your third trimester. It checks if your baby is receiving enough oxygen by monitoring the babys heart rate. Normally, a babys heart rate goes up when the baby moves. If the babys activity is low, it may mean that the baby isnt getting enough oxygen.    Fetal movement counting. Your HCP may tell you to track your babys movements by doing fetal kick counts. This is done by counting the number of movements (kicks) that the baby makes over a certain period. Your provider will let you know how often to count. Youll also be told when you should call him or her. If the babys movement pattern changes or decreases, more tests will likely be done to check the babys health.  Know your plan for labor and delivery  Before your due date, talk with your HCP about your labor and delivery plan. You will likely continue taking your asthma medicines during this time. These prevent a flare-up. They can also help relieve a flare-up if you have one. Your provider will tell you more about this.  When to call your healthcare provider  Call your HCP right away if any of the following happen:    You have wheezing that does not go away after you take medicine.    Your asthma medicines stop working.    You cough up bloody, green, or yellow mucus (signs of a lung infection).    You develop a temperature above 100.4 F (38 C) with shortness of breath or a cough.    Your babys movement pattern changes or decreases.   Date Last Reviewed: 12/1/2017 2000-2019 [employer]  [address] [place] [address]. All rights reserved. This information is not intended as a substitute for professional medical care. Always follow your healthcare professional's instructions.           Patient Education     Kick Counts    Its normal to worry about your babys health. One way you can know your babys doing well is to record the babys movements once a day. This is called a kick count. Remember to take your kick count records to all your appointments with your healthcare provider.  How to count kicks  Time how long it takes you to feel 10 kicks, flutters, swishes, or rolls. Ideally, you want to feel at least 10 movements within 2 hours. You will likely feel 10 movements in less time than that.  Starting at 28 weeks, count your baby's movements daily. Follow your healthcare provider's instructions for kick counting. Here are tips for counting kicks:    Choose a time when the baby is active, such as after a meal.     Sit comfortably or lie on your side.     The first time the baby moves, write down the time.     Count each movement until the baby has moved 10 times. This can take from 20 minutes to 2 hours.     If you have not felt 10 kicks by the end of the second hour, wait a few hours. Then try again.    Try to do it at the same time each day.  When to call your healthcare provider  Call your healthcare provider right away if:    You do a couple sets of kick counts during the day and your baby moves fewer than 10 times in 2 hours    Your baby moves much less often than on the days before.    You have not felt your baby move all day.  Date Last Reviewed: 12/1/2017 2000-2019 [employer]. [address] [place] [address]. All rights reserved. This information is not intended as a substitute for professional medical care. Always follow your healthcare professional's instructions.

## 2021-06-20 NOTE — LETTER
Letter by Annemarie May MD at      Author: Annemarie May MD Service: -- Author Type: --    Filed:  Encounter Date: 9/8/2020 Status: (Other)         Karuna Ann  [address] [place] [address]             September 8, 2020         Dear Ms. Ann,    Below are the results from your recent visit:    Resulted Orders   ABO/RH Typing (OP order)   Result Value Ref Range    HML ABO/Rh Typing AB POS     HML ABO/Rh Repeat Typing AB POS    Hepatitis B Surface antigen (HBsAG)   Result Value Ref Range    Hepatitis B Surface Ag Negative Negative   HIV Antigen/Antibody Screening Cascade   Result Value Ref Range    HIV Antigen / Antibody Negative Negative    Narrative    Method is Abbott HIV Ag/Ab for the detection of HIV p24 antigen, HIV-1 antibodies and HIV-2 antibodies.   HML Antibody Screen   Result Value Ref Range    HML Antibody Screen Negative Negative   RPR   Result Value Ref Range    Treponema Antibody (Syphilis) Negative Negative   Rubella Immune Status (IgG)   Result Value Ref Range    Rubella Antibody, IgG Positive     Narrative    Negative: Absence of detectable rubella virus IgG antibodies. A negative result presumes that immunity has not been acquired.     Equivocal: Suggest recollection.    Positive: Considered positive for IgG antibodies to rubella virus.   Urinalysis Macroscopic   Result Value Ref Range    Color, UA Yellow Colorless, Yellow, Straw, Light Yellow    Clarity, UA Clear Clear    Glucose, UA Negative Negative    Bilirubin, UA Negative Negative    Ketones, UA Negative Negative    Specific Gravity, UA 1.025 1.005 - 1.030    Blood, UA Negative Negative    pH, UA 7.0 5.0 - 8.0    Protein, UA Negative Negative mg/dL    Urobilinogen, UA 0.2 E.U./dL 0.2 E.U./dL, 1.0 E.U./dL    Nitrite, UA Negative Negative    Leukocytes, UA Negative Negative   Culture, Urine   Result Value Ref Range    Culture No Growth    HM1 (CBC with Diff)   Result Value Ref Range    WBC 6.3 4.0 - 11.0 thou/uL    RBC 4.58 3.80  - 5.40 mill/uL    Hemoglobin 12.6 12.0 - 16.0 g/dL    Hematocrit 37.7 35.0 - 47.0 %    MCV 82 80 - 100 fL    MCH 27.5 27.0 - 34.0 pg    MCHC 33.4 32.0 - 36.0 g/dL    RDW 13.2 11.0 - 14.5 %    Platelets 267 140 - 440 thou/uL    MPV 10.9 8.5 - 12.5 fL    Neutrophils % 72 (H) 50 - 70 %    Lymphocytes % 22 20 - 40 %    Monocytes % 5 2 - 10 %    Eosinophils % 1 0 - 6 %    Basophils % 0 0 - 2 %    Immature Granulocyte % 0 <=0 %    Neutrophils Absolute 4.5 2.0 - 7.7 thou/uL    Lymphocytes Absolute 1.4 0.8 - 4.4 thou/uL    Monocytes Absolute 0.3 0.0 - 0.9 thou/uL    Eosinophils Absolute 0.0 0.0 - 0.4 thou/uL    Basophils Absolute 0.0 0.0 - 0.2 thou/uL    Immature Granulocyte Absolute 0.0 <=0.0 thou/uL   Glycosylated Hemoglobin A1c   Result Value Ref Range    Hemoglobin A1c 5.7 (H) <=5.6 %      Comment:      Prediabetes:   HBA1c       5.7 to 6.4%        Diabetes:        HBA1c        >=6.5%   Patients with Hgb F >5%, total bilirubin >10.0 mg/dL, abnormal red cell turnover, severe renal or hepatic disease or malignancy should not have this A1C method used to diagnose or monitor diabetes.                           It was a pleasure to see you in the office the other day.     I reviewed Your  recent  lab results All  Of them  back with in normal limit for prenatal care your blood group is AB+   We have to be very careful with diet from the begaining as your diabetes screening putting you in prediabetes range   If you like we can use diabetic educator to help with good healthy food choices let me know and I can place this referral         Please feel free to call back for any concerns or questions.     Thank you again for allowing me to be a part of your care!     Please call with questions or contact us using [employer].    Sincerely,        Electronically signed by Annemarie May MD

## 2021-06-20 NOTE — LETTER
Letter by Corinne Marroquin RN at      Author: Corinne Marroquin RN Service: -- Author Type: --    Filed:  Encounter Date: 7/9/2020 Status: (Other)       7/9/2020        Karuna Edmondson4 [facility] [address]    This letter provides a written record that you were tested for COVID-19 on 7/7/20.     Your result was positive. This means you have COVID-19 (coronavirus).    How can I protect others?If you have symptoms, stay home and away from others (self-isolate) until:Youve had no fever--and no medicine that reduces fever--for 3 full days (72 hours). And?     Your other symptoms have gotten better. For example, your cough or breathing has improved. And?  At least 10 days have passed since your symptoms started.    If you dont have symptoms: Stay home and away from others (self-isolate) until at least 10 days have passed since your first positive COVID-19 test.    During this time:    Stay in your own room, including for meals. Use your own bathroom if you can.    Stay away from others in your home. No hugging, kissing or shaking hands. No visitors.     Dont go to work, school or anywhere else.     Clean high touch surfaces often (doorknobs, counters, handles, etc.). Use a household cleaning spray or wipes. Youll find a full list on the EPA website at www.epa.gov/pesticide-registration/list-n-disinfectants-use-against-sars-cov-2.     Cover your mouth and nose with a mask, tissue or washcloth to avoid spreading germs.    Wash your hands and face often with soap and water.    Caregivers in these groups are at risk for severe illness due to COVID-19:  o People 65 years and older  o People who live in a nursing home or long-term care facility  o People with chronic disease (lung, heart, cancer, diabetes, kidney, liver, immunologic)  o People who have a weakened immune system, including those who:    Are in cancer treatment    Take medicine that weakens the immune system, such as corticosteroids    Had a bone  marrow or organ transplant    Have an immune deficiency    Have poorly controlled HIV or AIDS    Are obese (body mass index of 40 or higher)    Smoke regularly    Caregivers should wear gloves while washing dishes, handling laundry and cleaning bedrooms and bathrooms.    Wash and dry laundry with special caution. Dont shake dirty laundry, and use the warmest water setting you can.    If you have a weakened immune system, ask your doctor about other actions you should take.    For more tips, go to www.cdc.gov/coronavirus/2019-ncov/downloads/10Things.pdf.    You should not go back to work until you meet the guidelines above for ending your home isolation. You should meet these along with any other guidelines that your employer has.    Employers: This document serves as formal notice of your employees medical guidelines for going back to work. They must meet the above guidelines before going back to work in person.    How can I take care of myself?    1. Get lots of rest. Drink extra fluids (unless a doctor has told you not to).    2. Take Tylenol (acetaminophen) for fever or pain. If you have liver or kidney problems, ask your family doctor if its okay to take Tylenol.     Take either:     650 mg (two 325 mg pills) every 4 to 6 hours, or?    1,000 mg (two 500 mg pills) every 8 hours as needed.     Note: Dont take more than 3,000 mg in one day. Acetaminophen is found in many medicines (both prescribed and over-the-counter medicines). Read all labels to be sure you dont take too much.    For children, check the Tylenol bottle for the right dose (based on their age or weight).    3. If you have other health problems (like cancer, heart failure, an organ transplant or severe kidney disease): Call your specialty clinic if you dont feel better in the next 2 days.    4. Know when to call 911: Emergency warning signs include:    Trouble breathing or shortness of breath    Pain or pressure in the chest that doesnt go  away    Feeling confused like you havent felt before, or not being able to wake up    Bluish-colored lips or face    5. Sign up for [email or web address]. We know its scary to hear that you have COVID-19. We want to track your symptoms to make sure youre okay over the next 2 weeks. Please look for an email from [email or web address]--this is a free, online program that well use to keep in touch. To sign up, follow the link in the email. Learn more at [email or web address].    Where can I get more information?    [facility]: [email or web address]    Coronavirus Basics: www.health.[facility].mn.us/diseases/coronavirus/basics.html    What to Do If Youre Sick: www.cdc.gov/coronavirus/2019-ncov/about/steps-when-sick.html    Ending Home Isolation: www.cdc.gov/coronavirus/2019-ncov/hcp/disposition-in-home-patients.html     Caring for Someone with COVID-19: www.cdc.gov/coronavirus/2019-ncov/if-you-are-sick/care-for-someone.html     [facility] clinical trials (COVID-19 research studies): clinicalaffairs.[facility]-clinical-trials

## 2021-06-20 NOTE — LETTER
Letter by Annemarie May MD at      Author: Annemarie May MD Service: -- Author Type: --    Filed:  Encounter Date: 10/7/2020 Status: (Other)       My Asthma Action Plan     Name: Karuna Ann   YOB: 1985  Date: 10/7/2020   My doctor: Annemarie May MD   My clinic: [facility]        My Rescue Medicine:   Albuterol (Proair/Ventolin/Proventil HFA) 2-4 puffs EVERY 4 HOURS as needed. Use a spacer if recommended by your provider.   My Asthma Severity:   Intermittent/Exercise Induced  Know your asthma triggers: upper respiratory infections, pollens, emotions and cold air             GREEN ZONE   Good Control    I feel good    No cough or wheeze    Can work, sleep and play without asthma symptoms     Take your asthma control medicine every day.     1. If exercise triggers your asthma, take your rescue medication    15 minutes before exercise or sports, and    During exercise if you have asthma symptoms  2. Spacer to use with inhaler: If you have a spacer, make sure to use it with your inhaler             YELLOW ZONE Getting Worse  I have ANY of these:    I do not feel good    Cough or wheeze    Chest feels tight    Wake up at night 1. Keep taking your Green Zone medications  2. Start taking your rescue medicine:    every 20 minutes for up to 1 hour. Then every 4 hours for 24-48 hours.  3. If you stay in the Yellow Zone for more than 12-24 hours, contact your doctor.  4. If you do not return to the Green Zone in 12-24 hours or you get worse, start taking your oral steroid medicine if prescribed by your provider.           RED ZONE Medical Alert - Get Help  I have ANY of these:    I feel awful    Medicine is not helping    Breathing getting harder    Trouble walking or talking    Nose opens wide to breathe     1. Take your rescue medicine NOW  2. If your provider has prescribed an oral steroid medicine, start taking it NOW  3. Call your doctor NOW  4. If you are still in the Red  Zone after 20 minutes and you have not reached your doctor:    Take your rescue medicine again and    Call 911 or go to the emergency room right away    See your regular doctor within 2 weeks of an Emergency Room or Urgent Care visit for follow-up treatment.          Annual Reminders:  Meet with Asthma Educator,  Flu Shot in the Fall, consider Pneumonia Vaccination for patients with asthma (aged 19 and older).    Pharmacy:   [id number] [address] AT [address]  [address]  [facility] [address]  Phone: [phone] Fax: [phone]      Electronically signed by Annemarie May MD   Date: 10/07/20                      Asthma Triggers  How To Control Things That Make Your Asthma Worse    Triggers are things that make your asthma worse.  Look at the list below to help you find your triggers and what you can do about them.  You can help prevent asthma flare-ups by staying away from your triggers.      Trigger                                                          What you can do   Cigarette Smoke  Tobacco smoke can make asthma worse. Do not allow smoking in your home, car or around you.  Be sure no one smokes at a [employer]ette day care or school.  If you smoke, ask your health care provider for ways to help you quit.  Ask family members to quit too.  Ask your health care provider for a referral to Quit Plan to help you quit smoking, or call 3-279-925-PLAN.     Colds, Flu, Bronchitis  These are common triggers of asthma. Wash your hands often.  Dont touch your eyes, nose or mouth.  Get a flu shot every year.     Dust Mites  These are tiny bugs that live in cloth or carpet. They are too small to see. Wash sheets and blankets in hot water every week.   Encase pillows and mattress in dust mite proof covers.  Avoid having carpet if you can. If you have carpet, vacuum weekly.   Use a dust mask and HEPA vacuum.   Pollen and Outdoor Mold  Some people  are allergic to trees, grass, or weed pollen, or molds. Try to keep your windows closed.  Limit time out doors when pollen count is high.   Ask you health care provider about taking medicine during allergy season.     Animal Dander  Some people are allergic to skin flakes, urine or saliva from pets with fur or feathers. Keep pets with fur or feathers out of your home.    If you cant keep the pet outdoors, then keep the pet out of your bedroom.  Keep the bedroom door closed.  Keep pets off cloth furniture and away from stuffed toys.     Mice, Rats, and Cockroaches  Some people are allergic to the waste from these pests.   Cover food and garbage.  Clean up spills and food crumbs.  Store grease in the refrigerator.   Keep food out of the bedroom.   Indoor Mold  This can be a trigger if your home has high moisture. Fix leaking faucets, pipes, or other sources of water.   Clean moldy surfaces.  Dehumidify basement if it is damp and smelly.   Smoke, Strong Odors, and Sprays  These can reduce air quality. Stay away from strong odors and sprays, such as perfume, powder, hair spray, paints, smoke incense, paint, cleaning products, candles and new carpet.   Exercise or Sports  Some people with asthma have this trigger. Be active!  Ask your doctor about taking medicine before sports or exercise to prevent symptoms.    Warm up for 5-10 minutes before and after sports or exercise.     Other Triggers of Asthma  Cold air:  Cover your nose and mouth with a scarf.  Sometimes laughing or crying can be a trigger.  Some medicines and food can trigger asthma.

## 2021-06-29 NOTE — PROGRESS NOTES
Progress Notes by Ying Moreno PT at 9/21/2020  2:00 PM     Author: Ying Moreno PT Service: -- Author Type: Physical Therapist    Filed: 9/21/2020  3:10 PM Encounter Date: 9/21/2020 Status: Attested    : Ying Moreno PT (Physical Therapist) Cosigner: Annemarie May MD at 9/21/2020  9:07 PM    Attestation signed by Annemarie May MD at 9/21/2020  9:07 PM    Agree with plan    Annemarie May MD 9/21/2020 9:07 PM                  [facility] Certification Request    September 21, 2020      Patient: Karuna Ann  MR Number: [id number]  YOB: 1985  Date of Visit: 9/21/2020      Dear :    Thank you for this referral.   We are seeing Karuna Ann for Physical Therapy of low back pain.    Medicare and/or Medicaid requires physician review and approval of the treatment plan. Please review the plan of care and verify that you agree with the therapy plan of care by co-signing this note.      Plan of Care  Authorization / Certification Start Date: 09/21/20  Authorization / Certification End Date: 12/20/20  Authorization / Certification Number of Visits: Georgiana ÁLVAREZ  Communication with: Referral Source;Patient Caregiver  Patient Related Instruction: Nature of Condition;Treatment plan and rationale;Self Care instruction;Basis of treatment;Body mechanics;Posture;Expected outcome  Times per Week: 2  Number of Weeks: 6  Number of Visits: 12 - PRN  Discharge Planning: Pt will be discharged upon meeting goals or plateau of progress  Therapeutic Exercise: ROM;Stretching;Strengthening  Neuromuscular Reeducation: kinesio tape;posture;core  Manual Therapy: soft tissue mobilization;myofascial release;joint mobilization;muscle energy;strain counterstrain  Modalities: cold pack;hot pack      Goals:  Pt. will demonstrate/verbalize independence in self-management of condition in : 6 weeks    Patient will decrease : ALLEGRA score;by _ points;for improved quality of function;in 6 weeks  by ___ points:  20% from original score  Pt will: be able to walk for errands and community mobility with increased ease and pain <5/10; in 6 weeks  Pt will: be able to sleep with increased ease and pain <5/10; in 6 weeks        If you have any questions or concerns, please don't hesitate to call.    Sincerely,      Ying Moreno, PT        Physician recommendation:     ___ Follow therapist's recommendation        ___ Modify therapy      *Physician co-signature indicates they certify the need for these services furnished within this plan and while under their care.      [facility]    [facility]-Pelvic Initial Evaluation    Patient Name: Karuna Ann  Date of evaluation: 9/21/2020  Referral Diagnosis: Chronic bilateral low back pain without sciatica  Referring provider: Annemarie May MD  Visit Diagnosis:     ICD-10-CM    1. Chronic bilateral low back pain without sciatica  M54.5     G89.29    2. Generalized muscle weakness  M62.81    3. First trimester pregnancy  Z34.91        Assessment:       Pt presents to PT with a chronic h/o low back pain with a recent increase in sx with a current pregnancy (1st trimester).  Pt has decreased lumbar mobility with increased pain.  Pt has significant core, lumbar and hip weakness.  Pt has increased tenderness over the lumbar spine and SI joints.   Pt will benefit from skilled PT to address these deficits identified in the evaluation.      Pt. is appropriate for skilled PT intervention as outlined in the Plan of Care (POC).    Goals:  Pt. will demonstrate/verbalize independence in self-management of condition in : 6 weeks    Patient will decrease : ALLEGRA score;by _ points;for improved quality of function;in 6 weeks  by ___ points: 20% from original score  Pt will: be able to walk for errands and community mobility with increased ease and pain <5/10; in 6 weeks  Pt will: be able to sleep with increased ease and pain <5/10; in 6 weeks      Patient's expectations/goals are realistic.    Barriers  to Learning or Achieving Goals:  No Barriers.       Plan / Patient Instructions:        Plan of Care:   Authorization / Certification Start Date: 09/21/20  Authorization / Certification End Date: 12/20/20  Authorization / Certification Number of Visits: Georgiana ÁLVAREZ  Communication with: Referral Source;Patient Caregiver  Patient Related Instruction: Nature of Condition;Treatment plan and rationale;Self Care instruction;Basis of treatment;Body mechanics;Posture;Expected outcome  Times per Week: 2  Number of Weeks: 6  Number of Visits: 12 - PRN  Discharge Planning: Pt will be discharged upon meeting goals or plateau of progress  Therapeutic Exercise: ROM;Stretching;Strengthening  Neuromuscular Reeducation: kinesio tape;posture;core  Manual Therapy: soft tissue mobilization;myofascial release;joint mobilization;muscle energy;strain counterstrain  Modalities: cold pack;hot pack      Plan for next visit: Focus on mobility, strength and pain control      Subjective:         History of Present Illness:    Karuna is a 35 y.o. female who presents to therapy today with complaints of low back pain. Date of onset/duration of symptoms is chronic but increased pain the past several months. Onset was gradual. Symptoms are intermittent and getting worse. She reports  a constant  history of similar symptoms. She describes their previous level of function as limited with spine mobility and pain control      Pt reports that at 3 months old - she was diagnosed with osteomyelitis.  They fused her spine somewhere in the mid back - she is unsure of the level and this was done at 3 months old per pt.     Pt reports that she is pregnant approx 9 weeks with her 5th child.  She has had back pain with all pregnancies.     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     Pt reports that she sleeps to help cope with her back pain.      Pt reports that has been a couple of years since  she did PT - after she gave birth to her 3 year old.   She does not do any specific back exercises.         Pain Ratin  Pain rating at best: 5  Pain rating at worst: 10  Pain description: aching, dull and spasms    Functional limitations are described as occurring with:   laying too long   sitting too long   walking too far and long     Patient reports benefit from:  heat, cold         Objective:      Note: Items left blank indicates the item was not performed or not indicated at the time of the evaluation.    Patient Outcome Measures :    Modified Oswestry Low Back Pain Disablity Questionnaire  in %: 74     Scores range from 0-100%, where a score of 0% represents minimal pain and maximal function. The minimal clinically important difference is a score reduction of 12%.    Examination  1. Chronic bilateral low back pain without sciatica     2. Generalized muscle weakness     3. First trimester pregnancy       Involved side: Bilateral  Posture Observation:      General sitting posture is  fair.    Lumbar ROM:    Date: 20     *Indicate scale AROM AROM AROM   Lumbar Flexion Min loss, pain and pulling in low back      Lumbar Extension No loss, feels good       Right Left Right Left Right Left   Lumbar Sidebending Min loss, R side LBP Min loss, R side LBP       Lumbar Rotation Min loss, min LBP Min loss, min LBP         Lower Extremity Strength:     Date: 20     LE strength/5 Right Left Right Left Right Left   Hip Flexion (L1-3) 4 4       Hip Extension (L5-S1) 3+ 3+       Hip Abduction (L4-5) 4+ 4+       Hip Adduction (L2-3) 5 5       Hip External Rotation 5 5       Hip Internal Rotation 5 5       Knee Extension (L3-4) 5 5       Knee Flexion 5 5       Ankle Dorsiflexion (L4-5) 5 5       Great Toe Extension (L5)         Ankle Plantar flexion (S1) 5 5       Abdominals Decreased strength and awareness        Sensation: WNL  Reflex Testing: NT     Palpation:  Increased tenderness over:  L1-sacrum   Very minimal  "lumbar paraspinals tenderness     Lumbar Special Tests:     Lumbar Special Tests Right Left SI Tests Right  Left   Quadrant test   SI Compression     Straight leg raise tight Tight  SI Distraction     Crossover response   POSH Test     Slump   Sacral Thrust + +   Sit-up test  FADIR - -   Trunk extensor endurance test  Resisted Abduction     Prone instability test  Other:     Pubic shotgun  Other:       Repeated Motion Testing:  Does not centralize  Does not peripheralize    Passive Mobility - Joint Integrity:  Hypomobile   Tender over L1-L5, sacrum     LE Screen:  LE weakness, some knee issues     Treatment Today:    Exercises:  Exercise #1: Nu-step, treadmill warm-up  Comment #1: next time  Exercise #2: Seated H/S stretch: B 2x30\"  Comment #2: Supine LTR: Bx10  Exercise #3: Ab set: x5 with 5\" hold  Comment #3: Supine Bridge: Bx10  Exercise #4: Standing SLR: ABD and ext  Comment #4: Bx10 each      TREATMENT MINUTES COMMENTS   Evaluation 30 Low complexity  Pathology, POC, HEP    Self-care/ Home management     Manual therapy     Neuromuscular Re-education     Therapeutic Activity     Therapeutic Exercises 30 See flow sheet or above    Gait training     Modality__________________                Total 60    Blank areas are intentional and mean the treatment did not include these items.     PT Evaluation Code: (Please list factors)  Patient History/Comorbidities:   Patient Active Problem List   Diagnosis   ? Asthma affecting pregnancy, antepartum   ? Asthma   ? Anemia of pregnancy in third trimester   ? Back pain affecting pregnancy   ? BMI 36.0-36.9,adult   ? Chronic GERD   ? Chronic low back pain   ? Cyst of brain   ? Hx of osteomyelitis   ? Nausea and vomiting in pregnancy   ? Patellofemoral pain syndrome of right knee   ? Depression   ? Mood disorder (H)   ? PTSD (post-traumatic stress disorder)   ? Screening for cervical cancer   ? Type AB blood, Rh positive   ? Morbid obesity (H)   ? History of 2019 novel " coronavirus disease (COVID-19)   ? Supervision of high-risk pregnancy of elderly multigravida   ? H/O gestational diabetes in prior pregnancy, currently pregnant      Examination: decreased lumbar ROM, core, lumbar and hip weakness, lumbar tenderness, etc   Clinical Presentation: stable and uncomplicated   Clinical Decision Making: low    Patient History/  Comorbidities Examination  (body structures and functions, activity limitations, and/or participation restrictions) Clinical Presentation Clinical Decision Making (Complexity)   No documented Comorbidities or personal factors 1-2 Elements Stable and/or uncomplicated Low   1-2 documented comorbidities or personal factor 3 Elements Evolving clinical presentation with changing characteristics Moderate   3-4 documented comorbidities or personal factors 4 or more Unstable and unpredictable High            Ying Moreno  9/21/2020  2:06 PM

## 2021-07-03 NOTE — ADDENDUM NOTE
Addendum Note by Annemarie May MD at 1/13/2021 11:20 AM     Author: Annemarie May MD Service: -- Author Type: Physician    Filed: 1/14/2021  3:00 PM Encounter Date: 1/13/2021 Status: Signed    : Annemarie May MD (Physician)    Addended by: ANNEMARIE MAY on: 1/14/2021 03:00 PM        Modules accepted: Orders

## 2021-07-03 NOTE — ADDENDUM NOTE
Addendum Note by Annemarie May MD at 11/13/2020 11:40 AM     Author: Annemarie May MD Service: -- Author Type: Physician    Filed: 12/7/2020 12:45 PM Encounter Date: 11/13/2020 Status: Signed    : Annemarie May MD (Physician)    Addended by: ANNEMARIE MAY on: 12/7/2020 12:45 PM        Modules accepted: Orders

## 2021-07-04 NOTE — ADDENDUM NOTE
Addendum Note by Avelina Rojas MLT at 3/1/2021  9:20 AM     Author: Avelina Rojas MLT Service: -- Author Type:     Filed: 3/1/2021  6:19 PM Encounter Date: 3/1/2021 Status: Signed    : Avelina Rojas MLT ()    Addended by: AVELINA ROJAS on: 3/1/2021 06:19 PM        Modules accepted: Orders

## 2021-07-05 PROBLEM — F33.9 EPISODE OF RECURRENT MAJOR DEPRESSIVE DISORDER (H): Status: ACTIVE | Noted: 2017-10-13

## 2021-07-14 PROBLEM — O09.299 H/O GESTATIONAL DIABETES IN PRIOR PREGNANCY, CURRENTLY PREGNANT: Status: RESOLVED | Noted: 2020-09-03 | Resolved: 2021-05-20

## 2021-07-14 PROBLEM — Z86.32 H/O GESTATIONAL DIABETES IN PRIOR PREGNANCY, CURRENTLY PREGNANT: Status: RESOLVED | Noted: 2020-09-03 | Resolved: 2021-05-20

## 2021-08-10 ENCOUNTER — MEDICAL CORRESPONDENCE (OUTPATIENT)
Dept: HEALTH INFORMATION MANAGEMENT | Facility: CLINIC | Age: 36
End: 2021-08-10

## 2021-08-15 ENCOUNTER — HEALTH MAINTENANCE LETTER (OUTPATIENT)
Age: 36
End: 2021-08-15

## 2021-09-18 ENCOUNTER — HOSPITAL ENCOUNTER (EMERGENCY)
Facility: CLINIC | Age: 36
Discharge: HOME | End: 2021-09-18
Attending: EMERGENCY MEDICINE | Admitting: EMERGENCY MEDICINE
Payer: COMMERCIAL

## 2021-09-18 VITALS
DIASTOLIC BLOOD PRESSURE: 91 MMHG | HEART RATE: 98 BPM | RESPIRATION RATE: 20 BRPM | WEIGHT: 220 LBS | BODY MASS INDEX: 41.54 KG/M2 | SYSTOLIC BLOOD PRESSURE: 145 MMHG | OXYGEN SATURATION: 97 % | HEIGHT: 61 IN | TEMPERATURE: 98.7 F

## 2021-09-18 DIAGNOSIS — M54.42 ACUTE LEFT-SIDED LOW BACK PAIN WITH LEFT-SIDED SCIATICA: ICD-10-CM

## 2021-09-18 PROCEDURE — 250N000013 HC RX MED GY IP 250 OP 250 PS 637: Performed by: EMERGENCY MEDICINE

## 2021-09-18 PROCEDURE — 99284 EMERGENCY DEPT VISIT MOD MDM: CPT

## 2021-09-18 RX ORDER — OXYCODONE HYDROCHLORIDE 5 MG/1
5 TABLET ORAL ONCE
Status: COMPLETED | OUTPATIENT
Start: 2021-09-18 | End: 2021-09-18

## 2021-09-18 RX ORDER — METHYLPREDNISOLONE 4 MG
TABLET, DOSE PACK ORAL
Qty: 21 TABLET | Refills: 0 | Status: SHIPPED | OUTPATIENT
Start: 2021-09-18 | End: 2022-02-01

## 2021-09-18 RX ORDER — IBUPROFEN 600 MG/1
600 TABLET, FILM COATED ORAL ONCE
Status: COMPLETED | OUTPATIENT
Start: 2021-09-18 | End: 2021-09-18

## 2021-09-18 RX ORDER — OXYCODONE HYDROCHLORIDE 5 MG/1
5 TABLET ORAL EVERY 6 HOURS PRN
Qty: 12 TABLET | Refills: 0 | Status: SHIPPED | OUTPATIENT
Start: 2021-09-18 | End: 2022-02-01

## 2021-09-18 RX ADMIN — IBUPROFEN 600 MG: 600 TABLET, FILM COATED ORAL at 02:37

## 2021-09-18 RX ADMIN — OXYCODONE HYDROCHLORIDE 5 MG: 5 TABLET ORAL at 02:37

## 2021-09-18 ASSESSMENT — ENCOUNTER SYMPTOMS
VOMITING: 0
CONSTIPATION: 0
BLOOD IN STOOL: 0
NAUSEA: 0
ABDOMINAL PAIN: 0
CHILLS: 0
ROS GI COMMENTS: NEGATIVE FOR INCONTINENCE.
WEAKNESS: 0
DIARRHEA: 0
CHEST TIGHTNESS: 0
NUMBNESS: 0
FEVER: 0
SHORTNESS OF BREATH: 0

## 2021-09-18 ASSESSMENT — MIFFLIN-ST. JEOR: SCORE: 1635.29
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
## 2021-09-18 NOTE — ED TRIAGE NOTES
Left groin and into left hip pain started this evening, changes in position are difficult.  Pain is only with movement, pt denies fall or injury, did lift daughter earlier in the day, not abnormal

## 2021-09-27 ENCOUNTER — E-VISIT (OUTPATIENT)
Dept: FAMILY MEDICINE | Facility: CLINIC | Age: 36
End: 2021-09-27
Payer: COMMERCIAL

## 2021-09-27 DIAGNOSIS — M54.6 ACUTE MIDLINE THORACIC BACK PAIN: ICD-10-CM

## 2021-09-27 DIAGNOSIS — M54.9 ACUTE UPPER BACK PAIN: Primary | ICD-10-CM

## 2021-09-27 PROCEDURE — 99421 OL DIG E/M SVC 5-10 MIN: CPT | Performed by: FAMILY MEDICINE

## 2021-09-28 RX ORDER — IBUPROFEN 600 MG/1
600 TABLET, FILM COATED ORAL
Qty: 100 TABLET | Refills: 1 | Status: SHIPPED | OUTPATIENT
Start: 2021-09-28 | End: 2024-07-01

## 2021-09-28 NOTE — PATIENT INSTRUCTIONS
Thank you for choosing us for your care. I have placed an order for a prescription so that you can start treatment. View your full visit summary for details by clicking on the link below. Your pharmacist will able to address any questions you may have about the medication.      If you're not feeling better within 2-3 weeks please schedule an appointment.  You can schedule an appointment right here in [facility], or call [phone]  If the visit is for the same symptoms as your eVisit, we'll refund the cost of your eVisit if seen within seven days.    Caring for Your Back    You are not alone.    Low back pain is very common. Nearly half of all adults will have low back pain in any given year. The good news is that back pain is rarely a danger to your health. Most people can manage their back pain on their own. About half of people start feeling better within two weeks. In 9 out of 10 cases, low back pain goes away or no longer limits daily activity within 6 weeks.     Your outlook is good!     Your symptoms tell us that your low back pain is most likely not a danger to you. Most of the time we will not know the exact cause of low back pain, even if you see a doctor or have an MRI. However, treatment can still work without knowing the cause of the pain. Less than 1 in 100 people need surgery for their back pain.     What can I do about my low back pain?     There are three basic things you can do to ease low back pain and help it go away.     Use heat or cold packs.    Take medicine as directed.    Use positions, movements and exercises.    Using heat or cold packs:    Try cold packs or gentle heat to ease your pain.  Use whichever gives you the most relief. Apply the cold pack or heat for 15 minutes at a time, as often as needed.    Taking medicine:    If taking over-the-counter medicine:    Take ibuprofen (Advil, Motrin) 600 mg three times a day as needed for pain.  OR    Take Aleve (naproxen) 220 to 440 mg two  times a day as needed for pain. If your doctor prescribed a muscle relaxant (cyclobenzaprine 10 mg.):    Take   to 1 tablet at bedtime.    Do not drive when taking this medicine. This drug may make you sleepy.     Using positions, movements and exercises:    Research tells us that moving your joints and muscles can help you recover from back pain. Such activity should be simple and gentle. Use the positions below as well as walking to help relieve your pain. Try taking a short walk every 3 to 4 hours during the day. Walk for a few minutes inside your home or take longer walks outside, on a treadmill or at a mall. Slowly increase the amount of time you walk. Expect discomfort when you begin, but it should lessen as your back starts to heal. When your back feels better, walk daily to keep your back and body healthy.    Finding a position that is comfortable:    When your back pain is new, certain positions will ease your pain. Gently try each of the positions below until you find one that is helpful. Once you find a position of comfort, use it as often as you like when you are resting. You will recover faster if you combine rest with activity.    * Lie on your back with your legs bent. You can do this by placing a pillow under your knees or lie on the floor and rest your lower legs on the seat of a chair.  * Lie on your side with your knees bent and place a pillow between your knees.    Lie on your stomach over pillows.       When should I call my doctor?    Your back pain should improve over the first couple of weeks. As it improves, you should be able to return to your normal activities.  But call your doctor if:      You have a sudden change in your ability to control your bladder or bowels.    You begin to feel tingling in your groin or legs.    The pain spreads down your leg and into your foot.    Your toes, feet or leg muscles begin to feel weak.    You feel generally unwell or sick.    Your pain gets  worse.    If you are deaf or hard of hearing, please let us know. We provide many free services including sign language interpreters, oral interpreters, TTYs, telephone amplifiers, note takers and written materials.    For informational purposes only. Not to replace the advice of your health care provider. Copyright   2013 [facility]. All rights reserved. [employer] 025132 - 04/13.    Mini Relaxation Exercises  Source [email or web address].org    Mini relaxation exercises are focused breathing techniques that help reduce  anxiety and tension immediately. You can do them with your eyes open or  closed. You can do them any place, at any time; no one will know that you are  doing them.    Good Times to Do a  Mini     Before taking an exam    While at the computer lab waiting for your document to print    While waiting in line    When someone says something that bothers you    While waiting for the announcement of a grade    While waiting for a phone call    In the dentist s chair    When you feel overwhelmed by what you need to accomplish in the near  future    When in pain    When you want to     Mini Version 1- Breath Focused  Position yourself in a supportive comfortable chair or lying in a position that is least painful. (Often this is on your back with pillows under your knees)    a. Count very slowly to yourself from ten down to zero, one number for  each breath. Thus, with the first diaphragmatic breath, you say  ten  to  yourself, with the next breath, you say  nine , etc. If you start feeling  light-headed or dizzy, slow down the counting. When you get to  zero ,  see how you are feeling. If you are feeling better, great! If not, try to  do it again.    b. As you inhale, count very slowly up to four; as you exhale, count slowly  back down to one. Thus, as you inhale, you say to yourself  one, two,  three, four , as you exhale, you say to yourself  four, three, two, one .  Do this several times.    c.  After each inhalation, pause for a few seconds; after you exhale, pause  again for a few seconds. Do this for several breaths.    Mini Version 2- Physical Focused  a. Massage your forehead, jaw, back of neck, and shoulders  b. Stretch and yawn  c. Walk counting four paces as you breathe in and four paces as you  breathe out  d. Coordinate your breath with any activity, for example, writing, jogging,  drawing, lifting weights, walking, etc.    Mini Version 3- Imagery Focused  Position yourself in a supportive comfortable chair or lying in a position that is least painful. (Often this is on your back with pillows under your knees)    a. Briefly picture yourself in a place you find relaxing  b. Contemplate a picture of a natural scene  c. Imagine the characteristics of one of the following or any other object  which portrays characteristics you find calming or supportive in the  moment:    Tree (strong, rooted, expansive)    Mountain (timeless, strong, stable)    Waves (fluid, limitless)    Sun (radiant, warm)    Wind (free)    Mini Version 4- Mindfulness Focused  a. Look out the window for a few moments  b. Notice something new  c. Listen. What is the most distant sound you hear? The next distant?  d. Appreciate the sensation of being in the situation, the feel, smell, sight of  certain objects  e. Focus on being exactly where you are, keeping your thoughts from flowing  into the future or past      Stress Reduction on the Fly    When most people think of stress reduction they think of activities like meditation, getting a massage, or going to the gym.  While these types of activities are great for stress reduction and lifestyle improvement, they often require time, effort or financial commitments that can become barriers for some people.  There are things, however, that you can do, without special training, equipment, or setting aside a lot of time in your day that will help relieve stress, and increase your ability  to cope with life s ups and downs.  Here are some simple suggestions, perhaps you can add a few of your own.   3 breath meditation. Breathe in and out using your diaphragm (if you don t already know how to do this we can show you how) Three deep breaths. Focus your attention on the feeling of the air going in and out through your nose.  Just for those three breaths focus only on the sensation of breathing. If something else comes into your mind just come back to the sensation of your breathing.   Stretch and yawn  Laugh   Recall a funny scene from your favorite movie. Caution: laughing  with  works better than laughing  at .  Look at or remember something beautiful  Arrange your commute so you go through the park. Visit a neighborhood flower shop just to smell the roses. Try to look at a single snowflake.  Find the classical station on the radio. What kinds of art do you like? Why?  Imagine    a. Briefly picture yourself in a place you find relaxing  b. Contemplate a picture of a natural scene  c. Imagine the characteristics of one of the following or any other object  which portrays characteristics you find calming or supportive in the  moment:  Tree (strong, rooted, expansive)  Mountain (timeless, strong, stable)  Waves (fluid, limitless)  Sun (radiant, warm)  Wind (free)    Be present in the moment    a. Look out the window for a few moments  b. Notice something new  c. Listen. What is the most distant sound you hear? The next distant?  d. Appreciate the sensation of being in the situation, the feel, smell, sight of  certain objects  e. Focus on being exactly where you are, keeping your thoughts from flowing  into the future or past

## 2021-10-11 ENCOUNTER — HEALTH MAINTENANCE LETTER (OUTPATIENT)
Age: 36
End: 2021-10-11

## 2021-11-14 ENCOUNTER — APPOINTMENT (OUTPATIENT)
Dept: URGENT CARE | Facility: CLINIC | Age: 36
End: 2021-11-14
Payer: COMMERCIAL

## 2021-11-19 ENCOUNTER — MEDICAL CORRESPONDENCE (OUTPATIENT)
Dept: HEALTH INFORMATION MANAGEMENT | Facility: CLINIC | Age: 36
End: 2021-11-19
Payer: COMMERCIAL

## 2021-12-06 ENCOUNTER — HOSPITAL ENCOUNTER (EMERGENCY)
Facility: CLINIC | Age: 36
Discharge: HOME | End: 2021-12-06
Attending: STUDENT IN AN ORGANIZED HEALTH CARE EDUCATION/TRAINING PROGRAM | Admitting: STUDENT IN AN ORGANIZED HEALTH CARE EDUCATION/TRAINING PROGRAM
Payer: COMMERCIAL

## 2021-12-06 VITALS
BODY MASS INDEX: 42.51 KG/M2 | RESPIRATION RATE: 18 BRPM | HEART RATE: 77 BPM | OXYGEN SATURATION: 100 % | WEIGHT: 225 LBS | SYSTOLIC BLOOD PRESSURE: 143 MMHG | DIASTOLIC BLOOD PRESSURE: 80 MMHG | TEMPERATURE: 96.5 F

## 2021-12-06 DIAGNOSIS — J06.9 UPPER RESPIRATORY TRACT INFECTION, UNSPECIFIED TYPE: ICD-10-CM

## 2021-12-06 LAB
ATRIAL RATE - MUSE: 78 BPM
DIASTOLIC BLOOD PRESSURE - MUSE: NORMAL MMHG
FLUAV RNA SPEC QL NAA+PROBE: NEGATIVE
FLUBV RNA RESP QL NAA+PROBE: NEGATIVE
INTERPRETATION ECG - MUSE: NORMAL
P AXIS - MUSE: 49 DEGREES
PR INTERVAL - MUSE: 138 MS
QRS DURATION - MUSE: 80 MS
QT - MUSE: 374 MS
QTC - MUSE: 426 MS
R AXIS - MUSE: 27 DEGREES
SARS-COV-2 RNA RESP QL NAA+PROBE: NEGATIVE
SYSTOLIC BLOOD PRESSURE - MUSE: NORMAL MMHG
T AXIS - MUSE: 30 DEGREES
VENTRICULAR RATE- MUSE: 78 BPM

## 2021-12-06 PROCEDURE — 93005 ELECTROCARDIOGRAM TRACING: CPT

## 2021-12-06 PROCEDURE — 93005 ELECTROCARDIOGRAM TRACING: CPT | Performed by: EMERGENCY MEDICINE

## 2021-12-06 PROCEDURE — 87636 SARSCOV2 & INF A&B AMP PRB: CPT | Performed by: EMERGENCY MEDICINE

## 2021-12-06 PROCEDURE — C9803 HOPD COVID-19 SPEC COLLECT: HCPCS

## 2021-12-06 PROCEDURE — 99284 EMERGENCY DEPT VISIT MOD MDM: CPT

## 2021-12-06 RX ORDER — ALBUTEROL SULFATE 90 UG/1
2 AEROSOL, METERED RESPIRATORY (INHALATION) EVERY 6 HOURS PRN
Qty: 18 G | Refills: 0 | Status: SHIPPED | OUTPATIENT
Start: 2021-12-06 | End: 2022-03-28

## 2021-12-06 RX ORDER — PREDNISONE 20 MG/1
TABLET ORAL
Qty: 10 TABLET | Refills: 0 | Status: SHIPPED | OUTPATIENT
Start: 2021-12-06 | End: 2022-02-01

## 2021-12-06 ASSESSMENT — ENCOUNTER SYMPTOMS
FATIGUE: 0
WHEEZING: 0
COUGH: 1
SHORTNESS OF BREATH: 1

## 2021-12-06 NOTE — ED TRIAGE NOTES
Pt here with cough nasal congestion and feeling short of breath for last two days. Pt is asthmatic and ran out of albuterol last evening.
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
## 2021-12-07 ENCOUNTER — PATIENT OUTREACH (OUTPATIENT)
Dept: CARE COORDINATION | Facility: CLINIC | Age: 36
End: 2021-12-07
Payer: COMMERCIAL

## 2021-12-07 DIAGNOSIS — Z71.89 OTHER SPECIFIED COUNSELING: ICD-10-CM

## 2021-12-07 NOTE — PROGRESS NOTES
Clinic Care Coordination Contact  [facility]: Post-Discharge Note  SITUATION                                                      Admission:    Admission Date: 12/06/21   Reason for Admission: Shortness of Breath   Cough   Nasal Congestion  Discharge:   Discharge Date: 12/06/21  Discharge Diagnosis: Shortness of Breath   Cough   Nasal Congestion    BACKGROUND                                                      Karuna Ann is a 36 year old female with a pertinent history of asthma, GERD, PTSD, morbid obesity who presents to this ED via walk-in for evaluation of shortness of breath, nasal congestion.     Patient reports she has had 4-5 days of a productive cough and nasal congestion. States she has been vomiting mucus with this. Notes that she did have a COVID-19 exposure at work recently but tested negative. Repeat negative test obtained in the ED. Patient also states the she has been having ongoing mild shortness of breath since she had COVID-19 this past July, and has been using her albuterol inhaler which provides temporary relief. Notes that she did run out of her inhaler yesterday.     ASSESSMENT      Enrollment  Primary Care Care Coordination Status: Declined    Discharge Assessment  How are you doing now that you are home?: My blood pressure is still high around 140 or 150, still coughing and feeling congested, have a headache in temple area  How are your symptoms? (Red Flag symptoms escalate to triage hotline per guidelines): Unchanged  Do you feel your condition is stable enough to be safe at home until your provider visit?: Yes (Patient feels okay to be at home for now but is waiting to see if symptoms improve)  Does the patient have their discharge instructions? : Yes  Does the patient have questions regarding their discharge instructions? : No  Were you started on any new medications or were there changes to any of your previous medications? : Yes  Does the patient have all of their  medications?: Yes  Do you have questions regarding any of your medications? : No  Do you have all of your needed medical supplies or equipment (DME)?  (i.e. oxygen tank, CPAP, cane, etc.): Yes  Discharge follow-up appointment scheduled within 14 calendar days? : No (Patient currently does not have follow up scehduled but is waiting to see how her symptoms do)  Is patient agreeable to assistance with scheduling? : No                  PLAN                                                      Outpatient Plan: Follow up with Annemarie May MD    No future appointments.      For any urgent concerns, please contact our 24 hour nurse triage line: [phone] ([phone])         RAMONA Oneal  [phone]  [facility]

## 2021-12-23 ENCOUNTER — E-VISIT (OUTPATIENT)
Dept: URGENT CARE | Facility: CLINIC | Age: 36
End: 2021-12-23
Payer: COMMERCIAL

## 2021-12-23 DIAGNOSIS — R05.9 COUGH: Primary | ICD-10-CM

## 2021-12-23 PROCEDURE — 99421 OL DIG E/M SVC 5-10 MIN: CPT | Performed by: EMERGENCY MEDICINE

## 2021-12-23 NOTE — PATIENT INSTRUCTIONS
"  Deaqing Ann    I want to try a special inhaler, Advair, with you to see if this will calm down this chronic cough.  It is important that you use it twice per day every day and not intermittently.  Set up a follow-up appointment with your PCP as my note to you discussed.          Fter reviewing your responses, I've been able to diagnose you with \"Bronchitis\" which is a common infection of your lungs that is nearly always caused by a virus. The virus causes swelling and irritation of the air passages of your lungs which leads to cough. The illness spreads from your nose and throat to your windpipe and airways. It is often called a \"chest cold\" and can last up to 2 weeks, but is not a serious illness. Exposure to cigarette smoke usually makes this significantly worse.      To treat bronchitis, the main thing to do is drink lots of fluids and rest. Cough medications over-the-counter such as mucinex, robitussin or \"cold and sinus\" medications can be helpful. Ibuprofen and Tylenol also help with fevers or aching feelings that you often have with this kind of illness. Do not take ibuprofen if you have kidney disease, stomach ulcers or allergy to aspirin.     Bronchitis is most often highly contagious as viruses are spread through the air or touch. Avoid contact with others who may become infected, particularly children, the elderly and those whose immune systems might be weak.     If your symptoms worsen, you develop chest pain or shortness of breath, fevers over 101, or are not improving in 5 days, please contact your primary care provider for an appointment or visit any of our convenient Walk-in Care or Urgent Care Centers to be seen which can be found on our website here.    Thanks again for choosing us as your health care partner,    Tramaine Saleh MD  "

## 2021-12-30 ENCOUNTER — E-VISIT (OUTPATIENT)
Dept: URGENT CARE | Facility: CLINIC | Age: 36
End: 2021-12-30
Payer: COMMERCIAL

## 2021-12-30 DIAGNOSIS — L30.9 DERMATITIS: Primary | ICD-10-CM

## 2021-12-30 PROCEDURE — 99207 PR NON-BILLABLE SERV PER CHARTING: CPT | Performed by: PHYSICIAN ASSISTANT

## 2021-12-30 NOTE — PATIENT INSTRUCTIONS
Dear Karuna Ann?     After reviewing your responses, I am unable to make a diagnosis that can be treated online.    You will not be charged for this eVisit.     We are dedicated to helping you achieve your best health and would like to see you in one of our many clinic locations - a primary care provider would be ideal for your concern.    Please use [employer] to schedule a visit with a provider or call [phone]) to schedule at any of our locations.    Thanks for choosing?us?as your health care partner,?   ?   Ying Boothe PA-C?

## 2022-01-18 VITALS
BODY MASS INDEX: 44.8 KG/M2 | DIASTOLIC BLOOD PRESSURE: 78 MMHG | SYSTOLIC BLOOD PRESSURE: 118 MMHG | WEIGHT: 229.4 LBS | HEART RATE: 96 BPM

## 2022-01-18 VITALS
WEIGHT: 224.1 LBS | HEART RATE: 88 BPM | SYSTOLIC BLOOD PRESSURE: 144 MMHG | DIASTOLIC BLOOD PRESSURE: 70 MMHG | BODY MASS INDEX: 43.77 KG/M2

## 2022-01-18 VITALS
DIASTOLIC BLOOD PRESSURE: 74 MMHG | OXYGEN SATURATION: 97 % | HEART RATE: 77 BPM | BODY MASS INDEX: 39.32 KG/M2 | SYSTOLIC BLOOD PRESSURE: 100 MMHG | WEIGHT: 213.7 LBS | HEIGHT: 62 IN

## 2022-01-18 VITALS
SYSTOLIC BLOOD PRESSURE: 124 MMHG | BODY MASS INDEX: 44.47 KG/M2 | DIASTOLIC BLOOD PRESSURE: 76 MMHG | WEIGHT: 227.7 LBS | HEART RATE: 88 BPM

## 2022-01-18 VITALS
HEART RATE: 105 BPM | SYSTOLIC BLOOD PRESSURE: 98 MMHG | BODY MASS INDEX: 44.59 KG/M2 | BODY MASS INDEX: 44.33 KG/M2 | SYSTOLIC BLOOD PRESSURE: 122 MMHG | BODY MASS INDEX: 44.61 KG/M2 | DIASTOLIC BLOOD PRESSURE: 70 MMHG | WEIGHT: 228.4 LBS | DIASTOLIC BLOOD PRESSURE: 70 MMHG | DIASTOLIC BLOOD PRESSURE: 70 MMHG | HEART RATE: 100 BPM | SYSTOLIC BLOOD PRESSURE: 120 MMHG | WEIGHT: 227 LBS | HEART RATE: 86 BPM | OXYGEN SATURATION: 99 % | WEIGHT: 228.3 LBS

## 2022-01-18 VITALS
DIASTOLIC BLOOD PRESSURE: 72 MMHG | WEIGHT: 226.8 LBS | BODY MASS INDEX: 44.29 KG/M2 | HEART RATE: 88 BPM | SYSTOLIC BLOOD PRESSURE: 120 MMHG

## 2022-01-18 VITALS — BODY MASS INDEX: 42.97 KG/M2 | SYSTOLIC BLOOD PRESSURE: 110 MMHG | WEIGHT: 220 LBS | DIASTOLIC BLOOD PRESSURE: 80 MMHG

## 2022-01-18 ASSESSMENT — PATIENT HEALTH QUESTIONNAIRE - PHQ9: SUM OF ALL RESPONSES TO PHQ QUESTIONS 1-9: 16

## 2022-02-01 ENCOUNTER — VIRTUAL VISIT (OUTPATIENT)
Dept: FAMILY MEDICINE | Facility: CLINIC | Age: 37
End: 2022-02-01
Payer: COMMERCIAL

## 2022-02-01 VITALS — DIASTOLIC BLOOD PRESSURE: 88 MMHG | HEART RATE: 80 BPM | SYSTOLIC BLOOD PRESSURE: 134 MMHG

## 2022-02-01 DIAGNOSIS — E66.01 MORBID OBESITY (H): ICD-10-CM

## 2022-02-01 DIAGNOSIS — J45.30 MILD PERSISTENT ASTHMA WITHOUT COMPLICATION: ICD-10-CM

## 2022-02-01 DIAGNOSIS — G89.29 CHRONIC BILATERAL LOW BACK PAIN WITHOUT SCIATICA: ICD-10-CM

## 2022-02-01 DIAGNOSIS — K21.9 CHRONIC GERD: ICD-10-CM

## 2022-02-01 DIAGNOSIS — Z76.89 ENCOUNTER FOR WEIGHT MANAGEMENT: Primary | ICD-10-CM

## 2022-02-01 DIAGNOSIS — M54.50 CHRONIC BILATERAL LOW BACK PAIN WITHOUT SCIATICA: ICD-10-CM

## 2022-02-01 PROBLEM — F32.A DEPRESSION: Status: RESOLVED | Noted: 2017-10-13 | Resolved: 2022-02-01

## 2022-02-01 PROCEDURE — 99214 OFFICE O/P EST MOD 30 MIN: CPT | Mod: 95 | Performed by: FAMILY MEDICINE

## 2022-02-01 RX ORDER — VALACYCLOVIR HYDROCHLORIDE 1 G/1
TABLET, FILM COATED ORAL
COMMUNITY
Start: 2021-07-25 | End: 2022-08-09

## 2022-02-01 RX ORDER — FLUTICASONE PROPIONATE 50 MCG
2 SPRAY, SUSPENSION (ML) NASAL DAILY
Qty: 16 G | Refills: 4 | Status: SHIPPED | OUTPATIENT
Start: 2022-02-01

## 2022-02-01 RX ORDER — RNA INGREDIENT BNT-162B2 0.23 G/1.8ML
INJECTION, SUSPENSION INTRAMUSCULAR
COMMUNITY
Start: 2021-06-18 | End: 2022-03-24

## 2022-02-01 RX ORDER — PHENTERMINE HYDROCHLORIDE 15 MG/1
15 CAPSULE ORAL EVERY MORNING
Qty: 30 CAPSULE | Refills: 1 | Status: SHIPPED | OUTPATIENT
Start: 2022-02-01 | End: 2022-03-24

## 2022-02-01 RX ORDER — FAMOTIDINE 20 MG/1
TABLET, FILM COATED ORAL
COMMUNITY
Start: 2020-03-30

## 2022-02-01 RX ORDER — TOPIRAMATE 50 MG/1
50 TABLET, FILM COATED ORAL DAILY
Qty: 30 TABLET | Refills: 0 | Status: SHIPPED | OUTPATIENT
Start: 2022-02-01 | End: 2022-03-03

## 2022-02-01 RX ORDER — CETIRIZINE HYDROCHLORIDE 10 MG/1
10 TABLET ORAL DAILY
Qty: 90 TABLET | Refills: 3 | Status: SHIPPED | OUTPATIENT
Start: 2022-02-01 | End: 2024-07-01

## 2022-02-01 RX ORDER — CETIRIZINE HYDROCHLORIDE 10 MG/1
10 TABLET ORAL DAILY
COMMUNITY
End: 2022-02-01

## 2022-02-01 ASSESSMENT — ASTHMA QUESTIONNAIRES
ACT_TOTALSCORE: 12
QUESTION_5 LAST FOUR WEEKS HOW WOULD YOU RATE YOUR ASTHMA CONTROL: POORLY CONTROLLED
EMERGENCY_ROOM_LAST_YEAR_TOTAL: ONE
QUESTION_3 LAST FOUR WEEKS HOW OFTEN DID YOUR ASTHMA SYMPTOMS (WHEEZING, COUGHING, SHORTNESS OF BREATH, CHEST TIGHTNESS OR PAIN) WAKE YOU UP AT NIGHT OR EARLIER THAN USUAL IN THE MORNING: TWO OR THREE NIGHTS A WEEK
QUESTION_1 LAST FOUR WEEKS HOW MUCH OF THE TIME DID YOUR ASTHMA KEEP YOU FROM GETTING AS MUCH DONE AT WORK, SCHOOL OR AT HOME: SOME OF THE TIME
QUESTION_4 LAST FOUR WEEKS HOW OFTEN HAVE YOU USED YOUR RESCUE INHALER OR NEBULIZER MEDICATION (SUCH AS ALBUTEROL): ONE OR TWO TIMES PER DAY
ACT_TOTALSCORE: 12
QUESTION_2 LAST FOUR WEEKS HOW OFTEN HAVE YOU HAD SHORTNESS OF BREATH: THREE TO SIX TIMES A WEEK

## 2022-02-01 NOTE — LETTER
My Depression Action Plan  Name: Karuna Ann   Date of Birth 1985  Date: 2/1/2022    My doctor: Annemarie May   My clinic: [address] [address] [phone]          GREEN    ZONE   Good Control    What it looks like:     Things are going generally well. You have normal ups and downs. You may even feel depressed from time to time, but bad moods usually last less than a day.   What you need to do:  1. Continue to care for yourself (see self care plan)  2. Check your depression survival kit and update it as needed  3. Follow your physician s recommendations including any medication.  4. Do not stop taking medication unless you consult with your physician first.           YELLOW         ZONE Getting Worse    What it looks like:     Depression is starting to interfere with your life.     It may be hard to get out of bed; you may be starting to isolate yourself from others.    Symptoms of depression are starting to last most all day and this has happened for several days.     You may have suicidal thoughts but they are not constant.   What you need to do:     1. Call your care team. Your response to treatment will improve if you keep your care team informed of your progress. Yellow periods are signs an adjustment may need to be made.     2. Continue your self-care.  Just get dressed and ready for the day.  Don't give yourself time to talk yourself out of it.    3. Talk to someone in your support network.    4. Open up your Depression Self-Care Plan/Wellness Kit.           RED    ZONE Medical Alert - Get Help    What it looks like:     Depression is seriously interfering with your life.     You may experience these or other symptoms: You can t get out of bed most days, can t work or engage in other necessary activities, you have trouble taking care of basic hygiene, or basic responsibilities, thoughts of suicide or death that will not  go away, self-injurious behavior.     What you need to do:  1. Call your care team and request a same-day appointment. If they are not available (weekends or after hours) call your local crisis line, emergency room or 911.          Depression Self-Care Plan / Wellness Kit    Many people find that medication and therapy are helpful treatments for managing depression. In addition, making small changes to your everyday life can help to boost your mood and improve your wellbeing. Below are some tips for you to consider. Be sure to talk with your medical provider and/or behavioral health consultant if your symptoms are worsening or not improving.     Sleep   Sleep hygiene  means all of the habits that support good, restful sleep. It includes maintaining a consistent bedtime and wake time, using your bedroom only for sleeping or sex, and keeping the bedroom dark and free of distractions like a computer, smartphone, or television.     Develop a Healthy Routine  Maintain good hygiene. Get out of bed in the morning, make your bed, brush your teeth, take a shower, and get dressed. Don t spend too much time viewing media that makes you feel stressed. Find time to relax each day.    Exercise  Get some form of exercise every day. This will help reduce pain and release endorphins, the  feel good  chemicals in your brain. It can be as simple as just going for a walk or doing some gardening, anything that will get you moving.      Diet  Strive to eat healthy foods, including fruits and vegetables. Drink plenty of water. Avoid excessive sugar, caffeine, alcohol, and other mood-altering substances.     Stay Connected with Others  Stay in touch with friends and family members.    Manage Your Mood  Try deep breathing, massage therapy, biofeedback, or meditation. Take part in fun activities when you can. Try to find something to smile about each day.     Psychotherapy  Be open to working with a therapist if your provider recommends it.      Medication  Be sure to take your medication as prescribed. Most anti-depressants need to be taken every day. It usually takes several weeks for medications to work. Not all medicines work for all people. It is important to follow-up with your provider to make sure you have a treatment plan that is working for you. Do not stop your medication abruptly without first discussing it with your provider.    Crisis Resources   These hotlines are for both adults and children. They and are open 24 hours a day, 7 days a week unless noted otherwise.      National Suicide Prevention Lifeline   3-572-791-TALK (9139)      Crisis Text Line    www.crisistextline.org  Text HOME to 791824 from anywhere in the [place], anytime, about any type of crisis. A live, trained crisis counselor will receive the text and respond quickly.      Power Lifeline for LGBTQ Youth  A national crisis intervention and suicide lifeline for LGBTQ youth under 25. Provides a safe place to talk without judgement. Call 1-187.328.2786; text START to 086448 or visit www.thetrevorproject.org to talk to a trained counselor.      For [facility] crisis numbers, visit the [facility] website at:  https://[place].gov/dhs/people-we-serve/adults/health-care/mental-health/resources/crisis-contacts.jsp

## 2022-02-01 NOTE — LETTER
My Asthma Action Plan    Name: Karuna Ann   YOB: 1985  Date: 2/1/2022   My doctor: Annemarie May MD   My clinic: [facility]        My Control Medicine: Fluticasone propionate + salmeterol (Advair Diskus or Wixela Inhub) -  250/50 mcg twice daily  My Rescue Medicine: Albuterol (Proair/Ventolin/Proventil HFA) 2-4 puffs EVERY 4 HOURS as needed. Use a spacer if recommended by your provider.  My Oral Steroid Medicine: prednisone  My Asthma Severity:   Moderate Persistent  Know your asthma triggers: upper respiratory infections, dust mites, pollens, exercise or sports, emotions, cold air and Gastric Reflux               GREEN ZONE   Good Control    I feel good    No cough or wheeze    Can work, sleep and play without asthma symptoms       Take your asthma control medicine every day.     1. If exercise triggers your asthma, take your rescue medication    15 minutes before exercise or sports, and    During exercise if you have asthma symptoms  2. Spacer to use with inhaler: If you have a spacer, make sure to use it with your inhaler             YELLOW ZONE Getting Worse  I have ANY of these:    I do not feel good    Cough or wheeze    Chest feels tight    Wake up at night   1. Keep taking your Green Zone medications  2. Start taking your rescue medicine:    every 20 minutes for up to 1 hour. Then every 4 hours for 24-48 hours.  3. If you stay in the Yellow Zone for more than 12-24 hours, contact your doctor.  4. If you do not return to the Green Zone in 12-24 hours or you get worse, start taking your oral steroid medicine if prescribed by your provider.           RED ZONE Medical Alert - Get Help  I have ANY of these:    I feel awful    Medicine is not helping    Breathing getting harder    Trouble walking or talking    Nose opens wide to breathe       1. Take your rescue medicine NOW  2. If your provider has prescribed an oral steroid medicine, start taking it NOW  3. Call  your doctor NOW  4. If you are still in the Red Zone after 20 minutes and you have not reached your doctor:    Take your rescue medicine again and    Call 911 or go to the emergency room right away    See your regular doctor within 2 weeks of an Emergency Room or Urgent Care visit for follow-up treatment.          Annual Reminders:  Meet with Asthma Educator,  Flu Shot in the Fall, consider Pneumonia Vaccination for patients with asthma (aged 19 and older).    Pharmacy: [id number] [place] [address] AT [facility] OF [address]    Electronically signed by Annemarie aMy MD   Date: 02/01/22                      Asthma Triggers  How To Control Things That Make Your Asthma Worse    Triggers are things that make your asthma worse.  Look at the list below to help you find your triggers and what you can do about them.  You can help prevent asthma flare-ups by staying away from your triggers.      Trigger                                                          What you can do   Cigarette Smoke  Tobacco smoke can make asthma worse. Do not allow smoking in your home, car or around you.  Be sure no one smokes at a child s day care or school.  If you smoke, ask your health care provider for ways to help you quit.  Ask family members to quit too.  Ask your health care provider for a referral to Quit Plan to help you quit smoking, or call 9-571-774-PLAN.     Colds, Flu, Bronchitis  These are common triggers of asthma. Wash your hands often.  Don t touch your eyes, nose or mouth.  Get a flu shot every year.     Dust Mites  These are tiny bugs that live in cloth or carpet. They are too small to see. Wash sheets and blankets in hot water every week.   Encase pillows and mattress in dust mite proof covers.  Avoid having carpet if you can. If you have carpet, vacuum weekly.   Use a dust mask and HEPA vacuum.   Pollen and Outdoor Mold  Some people are allergic to trees, grass, or weed pollen,  or molds. Try to keep your windows closed.  Limit time out doors when pollen count is high.   Ask you health care provider about taking medicine during allergy season.     Animal Dander  Some people are allergic to skin flakes, urine or saliva from pets with fur or feathers. Keep pets with fur or feathers out of your home.    If you can t keep the pet outdoors, then keep the pet out of your bedroom.  Keep the bedroom door closed.  Keep pets off cloth furniture and away from stuffed toys.     Mice, Rats, and Cockroaches   Some people are allergic to the waste from these pests.   Cover food and garbage.  Clean up spills and food crumbs.  Store grease in the refrigerator.   Keep food out of the bedroom.   Indoor Mold  This can be a trigger if your home has high moisture. Fix leaking faucets, pipes, or other sources of water.   Clean moldy surfaces.  Dehumidify basement if it is damp and smelly.   Smoke, Strong Odors, and Sprays  These can reduce air quality. Stay away from strong odors and sprays, such as perfume, powder, hair spray, paints, smoke incense, paint, cleaning products, candles and new carpet.   Exercise or Sports  Some people with asthma have this trigger. Be active!  Ask your doctor about taking medicine before sports or exercise to prevent symptoms.    Warm up for 5-10 minutes before and after sports or exercise.     Other Triggers of Asthma  Cold air:  Cover your nose and mouth with a scarf.  Sometimes laughing or crying can be a trigger.  Some medicines and food can trigger asthma.
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
## 2022-03-01 DIAGNOSIS — Z76.89 ENCOUNTER FOR WEIGHT MANAGEMENT: ICD-10-CM

## 2022-03-03 RX ORDER — TOPIRAMATE 50 MG/1
TABLET, FILM COATED ORAL
Qty: 30 TABLET | Refills: 0 | Status: SHIPPED | OUTPATIENT
Start: 2022-03-03 | End: 2022-04-13

## 2022-03-03 NOTE — TELEPHONE ENCOUNTER
"Routing refill request to provider for review/approval because:  Labs not current:  CBC    Last Written Prescription Date: 2/1/2022  Last Fill Quantity: 30,  # refills: 0   Last office visit provider:  2/1/2022 virtual visit Dr. May     Requested Prescriptions   Pending Prescriptions Disp Refills     topiramate (TOPAMAX) 50 MG tablet [Pharmacy Med Name: TOPIRAMATE 50MG TABLETS] 30 tablet 0     Sig: TAKE 1 TABLET(50 MG) BY MOUTH DAILY       Anti-Seizure Meds Protocol  Failed - 3/1/2022  4:12 PM        Failed - Review Authorizing provider's last note.      Refer to last progress notes: confirm request is for original authorizing provider (cannot be through other providers).          Failed - Normal CBC on file in past 26 months     Recent Labs   Lab Test 05/20/21  1425 01/14/21  0812 11/07/20  0255   WBC  --   --  6.2   RBC  --   --  4.12   HGB 12.1   < > 11.4*   HCT  --   --  33.8*   PLT  --   --  201    < > = values in this interval not displayed.                 Passed - Recent (12 mo) or future (30 days) visit within the authorizing provider's specialty     Patient has had an office visit with the authorizing provider or a provider within the authorizing providers department within the previous 12 mos or has a future within next 30 days. See \"Patient Info\" tab in inbasket, or \"Choose Columns\" in Meds & Orders section of the refill encounter.              Passed - Normal ALT or AST on file in past 26 months     Recent Labs   Lab Test 11/07/20  0255   ALT 19     Recent Labs   Lab Test 11/07/20  0255   AST 13             Passed - Normal platelet count on file in past 26 months     Recent Labs   Lab Test 11/07/20  0255                  Passed - Medication is active on med list        Passed - No active pregnancy on record        Passed - No positive pregnancy test in last 12 months             Fern Pena RN 03/03/22 1:56 PM  "

## 2022-03-24 ENCOUNTER — OFFICE VISIT (OUTPATIENT)
Dept: FAMILY MEDICINE | Facility: CLINIC | Age: 37
End: 2022-03-24
Payer: COMMERCIAL

## 2022-03-24 VITALS
HEIGHT: 62 IN | WEIGHT: 214.56 LBS | BODY MASS INDEX: 39.48 KG/M2 | SYSTOLIC BLOOD PRESSURE: 118 MMHG | HEART RATE: 84 BPM | DIASTOLIC BLOOD PRESSURE: 78 MMHG

## 2022-03-24 DIAGNOSIS — E66.01 MORBID OBESITY (H): ICD-10-CM

## 2022-03-24 DIAGNOSIS — Z80.0 FAMILY HISTORY OF COLON CANCER: ICD-10-CM

## 2022-03-24 DIAGNOSIS — F33.1 MODERATE EPISODE OF RECURRENT MAJOR DEPRESSIVE DISORDER (H): ICD-10-CM

## 2022-03-24 DIAGNOSIS — K92.1 BLOOD IN STOOL: ICD-10-CM

## 2022-03-24 DIAGNOSIS — Z00.01 ENCOUNTER FOR ROUTINE ADULT MEDICAL EXAM WITH ABNORMAL FINDINGS: Primary | ICD-10-CM

## 2022-03-24 DIAGNOSIS — Z13.228 SCREENING FOR METABOLIC DISORDER: ICD-10-CM

## 2022-03-24 DIAGNOSIS — Z76.89 ENCOUNTER FOR WEIGHT MANAGEMENT: ICD-10-CM

## 2022-03-24 DIAGNOSIS — K21.9 CHRONIC GERD: ICD-10-CM

## 2022-03-24 DIAGNOSIS — J45.30 MILD PERSISTENT ASTHMA WITHOUT COMPLICATION: ICD-10-CM

## 2022-03-24 DIAGNOSIS — E55.9 VITAMIN D DEFICIENCY: ICD-10-CM

## 2022-03-24 LAB — HBA1C MFR BLD: 5.3 % (ref 0–5.6)

## 2022-03-24 PROCEDURE — 99214 OFFICE O/P EST MOD 30 MIN: CPT | Mod: 25 | Performed by: FAMILY MEDICINE

## 2022-03-24 PROCEDURE — 91305 COVID-19,PF,PFIZER (12+ YRS): CPT | Performed by: FAMILY MEDICINE

## 2022-03-24 PROCEDURE — 83036 HEMOGLOBIN GLYCOSYLATED A1C: CPT | Performed by: FAMILY MEDICINE

## 2022-03-24 PROCEDURE — 80061 LIPID PANEL: CPT | Performed by: FAMILY MEDICINE

## 2022-03-24 PROCEDURE — 99395 PREV VISIT EST AGE 18-39: CPT | Performed by: FAMILY MEDICINE

## 2022-03-24 PROCEDURE — 84443 ASSAY THYROID STIM HORMONE: CPT | Performed by: FAMILY MEDICINE

## 2022-03-24 PROCEDURE — 82306 VITAMIN D 25 HYDROXY: CPT | Performed by: FAMILY MEDICINE

## 2022-03-24 PROCEDURE — 36415 COLL VENOUS BLD VENIPUNCTURE: CPT | Performed by: FAMILY MEDICINE

## 2022-03-24 PROCEDURE — 0054A COVID-19,PF,PFIZER (12+ YRS): CPT | Performed by: FAMILY MEDICINE

## 2022-03-24 RX ORDER — PHENTERMINE HYDROCHLORIDE 30 MG/1
30 CAPSULE ORAL EVERY MORNING
Qty: 90 CAPSULE | Refills: 0 | Status: SHIPPED | OUTPATIENT
Start: 2022-03-24 | End: 2022-05-26
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
## 2022-03-25 LAB
CHOLEST SERPL-MCNC: 129 MG/DL
FASTING STATUS PATIENT QL REPORTED: NO
HDLC SERPL-MCNC: 40 MG/DL
LDLC SERPL CALC-MCNC: 78 MG/DL
TRIGL SERPL-MCNC: 57 MG/DL
TSH SERPL DL<=0.005 MIU/L-ACNC: 1 UIU/ML (ref 0.3–5)

## 2022-03-27 LAB — DEPRECATED CALCIDIOL+CALCIFEROL SERPL-MC: 6 UG/L (ref 20–75)

## 2022-03-28 ENCOUNTER — MYC MEDICAL ADVICE (OUTPATIENT)
Dept: FAMILY MEDICINE | Facility: CLINIC | Age: 37
End: 2022-03-28
Payer: COMMERCIAL

## 2022-03-28 DIAGNOSIS — R05.9 COUGH: ICD-10-CM

## 2022-03-28 RX ORDER — CHOLECALCIFEROL (VITAMIN D3) 50 MCG
1 TABLET ORAL DAILY
Qty: 90 TABLET | Refills: 4 | Status: SHIPPED | OUTPATIENT
Start: 2022-03-28

## 2022-03-28 RX ORDER — ALBUTEROL SULFATE 90 UG/1
2 AEROSOL, METERED RESPIRATORY (INHALATION) EVERY 6 HOURS PRN
Qty: 18 G | Refills: 0 | Status: SHIPPED | OUTPATIENT
Start: 2022-03-28 | End: 2022-05-30

## 2022-03-28 RX ORDER — ERGOCALCIFEROL 1.25 MG/1
50000 CAPSULE, LIQUID FILLED ORAL WEEKLY
Qty: 24 CAPSULE | Refills: 1 | Status: SHIPPED | OUTPATIENT
Start: 2022-03-28 | End: 2022-09-29

## 2022-03-28 NOTE — TELEPHONE ENCOUNTER
advair and albuterol last sent in December 2021.  Zyrtec last filled in 2/1/22 with 3 refills.  Please advise on vitamin d level. KAVON JULIAN on 3/28/2022 at 8:15 AM

## 2022-04-09 DIAGNOSIS — Z76.89 ENCOUNTER FOR WEIGHT MANAGEMENT: ICD-10-CM

## 2022-04-12 NOTE — TELEPHONE ENCOUNTER
"Routing refill request to provider for review/approval because:  Labs out of range:  CBC  Labs not current:  CBC  Anti-seizure med protocol failed    Last Written Prescription Date:  3/3/2022  Last Fill Quantity: 30,  # refills: 0   Last office visit provider:  3/24/2022     Requested Prescriptions   Pending Prescriptions Disp Refills     topiramate (TOPAMAX) 50 MG tablet [Pharmacy Med Name: TOPIRAMATE 50MG TABLETS] 30 tablet 0     Sig: TAKE 1 TABLET(50 MG) BY MOUTH DAILY       Anti-Seizure Meds Protocol  Failed - 4/12/2022  2:44 PM        Failed - Review Authorizing provider's last note.      Refer to last progress notes: confirm request is for original authorizing provider (cannot be through other providers).          Failed - Normal CBC on file in past 26 months     Recent Labs   Lab Test 05/20/21  1425 01/14/21  0812 11/07/20  0255   WBC  --   --  6.2   RBC  --   --  4.12   HGB 12.1   < > 11.4*   HCT  --   --  33.8*   PLT  --   --  201    < > = values in this interval not displayed.                 Passed - Recent (12 mo) or future (30 days) visit within the authorizing provider's specialty     Patient has had an office visit with the authorizing provider or a provider within the authorizing providers department within the previous 12 mos or has a future within next 30 days. See \"Patient Info\" tab in inbasket, or \"Choose Columns\" in Meds & Orders section of the refill encounter.              Passed - Normal ALT or AST on file in past 26 months     Recent Labs   Lab Test 11/07/20  0255   ALT 19     Recent Labs   Lab Test 11/07/20  0255   AST 13             Passed - Normal platelet count on file in past 26 months     Recent Labs   Lab Test 11/07/20  0255                  Passed - Medication is active on med list        Passed - No active pregnancy on record        Passed - No positive pregnancy test in last 12 months             Alvina Chacko RN 04/12/22 2:44 PM  "

## 2022-04-13 RX ORDER — TOPIRAMATE 50 MG/1
TABLET, FILM COATED ORAL
Qty: 30 TABLET | Refills: 0 | Status: SHIPPED | OUTPATIENT
Start: 2022-04-13 | End: 2022-04-19

## 2022-04-19 ENCOUNTER — MYC MEDICAL ADVICE (OUTPATIENT)
Dept: FAMILY MEDICINE | Facility: CLINIC | Age: 37
End: 2022-04-19
Payer: COMMERCIAL

## 2022-04-19 DIAGNOSIS — Z76.89 ENCOUNTER FOR WEIGHT MANAGEMENT: ICD-10-CM

## 2022-04-19 RX ORDER — TOPIRAMATE 100 MG/1
50 TABLET, FILM COATED ORAL DAILY
Qty: 90 TABLET | Refills: 1 | Status: SHIPPED | OUTPATIENT
Start: 2022-04-19 | End: 2022-06-21

## 2022-05-14 DIAGNOSIS — Z76.89 ENCOUNTER FOR WEIGHT MANAGEMENT: ICD-10-CM

## 2022-05-16 RX ORDER — TOPIRAMATE 50 MG/1
TABLET, FILM COATED ORAL
Qty: 30 TABLET | Refills: 0 | Status: SHIPPED | OUTPATIENT
Start: 2022-05-16 | End: 2022-06-09 | Stop reason: DRUGHIGH

## 2022-05-26 ENCOUNTER — MYC REFILL (OUTPATIENT)
Dept: FAMILY MEDICINE | Facility: CLINIC | Age: 37
End: 2022-05-26
Payer: COMMERCIAL

## 2022-05-26 DIAGNOSIS — Z76.89 ENCOUNTER FOR WEIGHT MANAGEMENT: ICD-10-CM

## 2022-05-27 NOTE — TELEPHONE ENCOUNTER
Routing refill request to provider for review/approval because:  Drug not on the G refill protocol     Last Written Prescription Date:  3/24/22  Last Fill Quantity: 90,  # refills: 0   Last office visit provider:  3/24/22     Requested Prescriptions   Pending Prescriptions Disp Refills     phentermine (ADIPEX-P) 30 MG capsule 90 capsule 0     Sig: Take 1 capsule (30 mg) by mouth every morning       There is no refill protocol information for this order          Avelina Andres, RN 05/26/22 9:43 PM

## 2022-05-28 DIAGNOSIS — R05.9 COUGH: ICD-10-CM

## 2022-05-30 RX ORDER — ALBUTEROL SULFATE 90 UG/1
AEROSOL, METERED RESPIRATORY (INHALATION)
Qty: 18 G | Refills: 0 | Status: SHIPPED | OUTPATIENT
Start: 2022-05-30 | End: 2023-03-27

## 2022-05-30 NOTE — TELEPHONE ENCOUNTER
"Routing refill request to provider for review/approval because:  act    Last Written Prescription Date:  3/28/22  Last Fill Quantity: 18,  # refills: 0   Last office visit provider:  3/24/22     Requested Prescriptions   Pending Prescriptions Disp Refills     VENTOLIN  (90 Base) MCG/ACT inhaler [Pharmacy Med Name: VENTOLIN HFA INH W/DOS CTR 200PUFFS] 18 g 0     Sig: INHALE 2 PUFFS INTO THE LUNGS EVERY 6 HOURS AS NEEDED FOR SHORTNESS OF BREATH OR DIFFICULT BREATHING OR WHEEZING       Asthma Maintenance Inhalers - Anticholinergics Failed - 5/28/2022  3:06 PM        Failed - Asthma control assessment score within normal limits in last 6 months     Please review ACT score.           Passed - Patient is age 12 years or older        Passed - Medication is active on med list        Passed - Recent (6 mo) or future (30 days) visit within the authorizing provider's specialty     Patient had office visit in the last 6 months or has a visit in the next 30 days with authorizing provider or within the authorizing provider's specialty.  See \"Patient Info\" tab in inbasket, or \"Choose Columns\" in Meds & Orders section of the refill encounter.           Short-Acting Beta Agonist Inhalers Protocol  Failed - 5/28/2022  3:06 PM        Failed - Asthma control assessment score within normal limits in last 6 months     Please review ACT score.           Passed - Patient is age 12 or older        Passed - Medication is active on med list        Passed - Recent (6 mo) or future (30 days) visit within the authorizing provider's specialty     Patient had office visit in the last 6 months or has a visit in the next 30 days with authorizing provider or within the authorizing provider's specialty.  See \"Patient Info\" tab in inbasket, or \"Choose Columns\" in Meds & Orders section of the refill encounter.                 Avelina Andres RN 05/30/22 5:08 PM  "

## 2022-06-02 RX ORDER — PHENTERMINE HYDROCHLORIDE 30 MG/1
30 CAPSULE ORAL EVERY MORNING
Qty: 90 CAPSULE | Refills: 0 | Status: SHIPPED | OUTPATIENT
Start: 2022-06-02 | End: 2022-09-29

## 2022-06-20 ENCOUNTER — MYC MEDICAL ADVICE (OUTPATIENT)
Dept: FAMILY MEDICINE | Facility: CLINIC | Age: 37
End: 2022-06-20
Payer: COMMERCIAL

## 2022-06-20 DIAGNOSIS — Z76.89 ENCOUNTER FOR WEIGHT MANAGEMENT: ICD-10-CM

## 2022-06-21 RX ORDER — TOPIRAMATE 100 MG/1
100 TABLET, FILM COATED ORAL DAILY
Qty: 90 TABLET | Refills: 3 | Status: SHIPPED | OUTPATIENT
Start: 2022-06-21 | End: 2022-09-29

## 2022-06-21 NOTE — TELEPHONE ENCOUNTER
See MyChart from patient needing PCP review.    Please respond directly to patient if at all able.    Kimmy SALES RN  [facility]

## 2022-08-06 ENCOUNTER — E-VISIT (OUTPATIENT)
Dept: FAMILY MEDICINE | Facility: CLINIC | Age: 37
End: 2022-08-06
Payer: COMMERCIAL

## 2022-08-06 DIAGNOSIS — T83.32XA DISPLACEMENT OF INTRAUTERINE CONTRACEPTIVE DEVICE, INITIAL ENCOUNTER: ICD-10-CM

## 2022-08-06 DIAGNOSIS — Z30.012: Primary | ICD-10-CM

## 2022-08-06 DIAGNOSIS — Z30.8 ENCOUNTER FOR TUBAL LIGATION COUNSELING: ICD-10-CM

## 2022-08-06 PROCEDURE — 99422 OL DIG E/M SVC 11-20 MIN: CPT | Performed by: FAMILY MEDICINE

## 2022-08-09 RX ORDER — LEVONORGESTREL 1.5 MG/1
1.5 TABLET ORAL ONCE
Qty: 1 TABLET | Refills: 0 | Status: SHIPPED | OUTPATIENT
Start: 2022-08-09 | End: 2023-01-25

## 2022-08-09 NOTE — PATIENT INSTRUCTIONS
Norberto Beckman,    I am sorry that IUD fell out, please make brian in the clinic ASAP, to replace it if you like , I have filled the A prescription for PLAN B , it has to be taken within 72 our of unprotected sex  Also referral placed for OB for tubal ligation consult    Annemarie May MD       Thank you for choosing us for your care. I have placed an order for a prescription so that you can start treatment. View your full visit summary for details by clicking on the link below. Your pharmacist will able to address any questions you may have about the medication.     If you're not feeling better within 5-7 days, please schedule an appointment.  You can schedule an appointment right here in [facility], or call [phone]  If the visit is for the same symptoms as your eVisit, we'll refund the cost of your eVisit if seen within seven days.    Thank you for choosing us for your care. I think an in-clinic visit would be best next steps based on your symptoms. Please schedule a clinic appointment; you won t be charged for this eVisit.      You can schedule an appointment right here in [facility], or call [phone]

## 2022-09-25 ENCOUNTER — HEALTH MAINTENANCE LETTER (OUTPATIENT)
Age: 37
End: 2022-09-25

## 2022-09-29 ENCOUNTER — VIRTUAL VISIT (OUTPATIENT)
Dept: FAMILY MEDICINE | Facility: CLINIC | Age: 37
End: 2022-09-29
Payer: COMMERCIAL

## 2022-09-29 VITALS — WEIGHT: 189 LBS | BODY MASS INDEX: 35.13 KG/M2

## 2022-09-29 DIAGNOSIS — E66.01 MORBID OBESITY (H): ICD-10-CM

## 2022-09-29 DIAGNOSIS — Z76.89 ENCOUNTER FOR WEIGHT MANAGEMENT: Primary | ICD-10-CM

## 2022-09-29 DIAGNOSIS — Z76.89 ENCOUNTER FOR WEIGHT MANAGEMENT: ICD-10-CM

## 2022-09-29 DIAGNOSIS — T83.39XS OTHER MECHANICAL COMPLICATION OF INTRAUTERINE CONTRACEPTIVE DEVICE, SEQUELA: ICD-10-CM

## 2022-09-29 PROBLEM — M22.2X1 PATELLOFEMORAL PAIN SYNDROME OF RIGHT KNEE: Status: RESOLVED | Noted: 2018-04-12 | Resolved: 2022-09-29

## 2022-09-29 PROCEDURE — 99214 OFFICE O/P EST MOD 30 MIN: CPT | Mod: 95 | Performed by: FAMILY MEDICINE

## 2022-09-29 RX ORDER — TOPIRAMATE 100 MG/1
100 TABLET, FILM COATED ORAL DAILY
Qty: 90 TABLET | Refills: 3 | Status: SHIPPED | OUTPATIENT
Start: 2022-09-29 | End: 2023-03-27

## 2022-09-29 RX ORDER — PHENTERMINE HYDROCHLORIDE 37.5 MG/1
37.5 CAPSULE ORAL EVERY MORNING
Qty: 90 CAPSULE | Refills: 1 | Status: SHIPPED | OUTPATIENT
Start: 2022-09-29 | End: 2023-03-27

## 2022-09-29 ASSESSMENT — ASTHMA QUESTIONNAIRES
QUESTION_4 LAST FOUR WEEKS HOW OFTEN HAVE YOU USED YOUR RESCUE INHALER OR NEBULIZER MEDICATION (SUCH AS ALBUTEROL): ONCE A WEEK OR LESS
QUESTION_5 LAST FOUR WEEKS HOW WOULD YOU RATE YOUR ASTHMA CONTROL: SOMEWHAT CONTROLLED
ACT_TOTALSCORE: 19
QUESTION_3 LAST FOUR WEEKS HOW OFTEN DID YOUR ASTHMA SYMPTOMS (WHEEZING, COUGHING, SHORTNESS OF BREATH, CHEST TIGHTNESS OR PAIN) WAKE YOU UP AT NIGHT OR EARLIER THAN USUAL IN THE MORNING: ONCE OR TWICE
QUESTION_2 LAST FOUR WEEKS HOW OFTEN HAVE YOU HAD SHORTNESS OF BREATH: ONCE OR TWICE A WEEK
ACT_TOTALSCORE: 19
QUESTION_1 LAST FOUR WEEKS HOW MUCH OF THE TIME DID YOUR ASTHMA KEEP YOU FROM GETTING AS MUCH DONE AT WORK, SCHOOL OR AT HOME: A LITTLE OF THE TIME

## 2022-09-29 ASSESSMENT — PATIENT HEALTH QUESTIONNAIRE - PHQ9
SUM OF ALL RESPONSES TO PHQ QUESTIONS 1-9: 11
10. IF YOU CHECKED OFF ANY PROBLEMS, HOW DIFFICULT HAVE THESE PROBLEMS MADE IT FOR YOU TO DO YOUR WORK, TAKE CARE OF THINGS AT HOME, OR GET ALONG WITH OTHER PEOPLE: SOMEWHAT DIFFICULT
SUM OF ALL RESPONSES TO PHQ QUESTIONS 1-9: 11

## 2022-09-29 NOTE — PROGRESS NOTES
"Karuna Ann 37 year old who is being evaluated via a billable video visit.      How would you like to obtain your AVS? MyChart  If the video visit is dropped, the invitation should be resent by: Text to cell phone: [phone]  Will anyone else be joining your video visit? No        Assessment & Plan     Karuna was seen today for recheck medication.    Diagnoses and all orders for this visit:    Encounter for weight management  Comments:  Patient advised on intermittent fasting and healthy food choices, restart  phentermine with Topamax combination to help suppress the appetite and binge  Orders:  -     topiramate (TOPAMAX) 100 MG tablet; Take 1 tablet (100 mg) by mouth daily  -     phentermine (ADIPEX-P) 37.5 MG capsule; Take 1 capsule (37.5 mg) by mouth every morning    Encounter for weight management  -     topiramate (TOPAMAX) 100 MG tablet; Take 1 tablet (100 mg) by mouth daily  -     phentermine (ADIPEX-P) 37.5 MG capsule; Take 1 capsule (37.5 mg) by mouth every morning    Morbid obesity (H)    Other mechanical complication of intrauterine contraceptive device, sequela  Comments:  IUD accidently came out one month ago, had regular cycle after that LMP 9/15/22  Plan to make brian for IUD insertion by next month . Adv to take IBU before the procedure        [id number]}     BMI:   Estimated body mass index is 35.13 kg/m  as calculated from the following:    Height as of 3/24/22: 1.562 m (5' 1.5\").    Weight as of this encounter: 85.7 kg (189 lb).   Weight management plan: Discussed healthy diet and exercise guidelines        No follow-ups on file.    Annemarie May MD    [facility]    Subjective     Karuna Ann 37 year old ACCOMPANIED BY STATEMENT (Optional):[id number]}, presenting for the following health issues:  Recheck Medication        History of Present Illness       Reason for visit:  Appetite meds after being off for over a month. And referral to get scan for brain cyst    She " eats 2-3 servings of fruits and vegetables daily.She consumes 0 sweetened beverage(s) daily.She exercises with enough effort to increase her heart rate 20 to 29 minutes per day.  She exercises with enough effort to increase her heart rate 3 or less days per week.   She is taking medications regularly.    Today's PHQ-9         PHQ-9 Total Score: 11    PHQ-9 Q9 Thoughts of better off dead/self-harm past 2 weeks :   Not at all    How difficult have these problems made it for you to do your work, take care of things at home, or get along with other people: Somewhat difficult         Obesity: Patient did lose significant weight on phentermine Topamax combination then she was off her medication for 1 month.  Now the weight is going up again lowest weight 189 now she is 200.  Starting weight was 225 .  Like to refill the medication for 3 more month and see if she able to lose more weight.  Again we had advised on lifestyle changing with healthy eating habits and exercise is the key to maintain weight loss journey    Wt Readings from Last 3 Encounters:   09/29/22 85.7 kg (189 lb)   03/24/22 97.3 kg (214 lb 9 oz)   12/06/21 102.1 kg (225 lb)     Wt Readings from Last 3 Encounters:   03/24/22 97.3 kg (214 lb 9 oz)   12/06/21 102.1 kg (225 lb)   09/18/21 99.8 kg (220 lb)     Review of Systems   Constitutional, HEENT, cardiovascular, pulmonary, gi and gu systems are negative, except as otherwise noted.      Objective           Vitals:  No vitals were obtained today due to virtual visit.    Physical Exam   GENERAL: Healthy, alert and no distress  EYES: Eyes grossly normal to inspection.  No discharge or erythema, or obvious scleral/conjunctival abnormalities.  RESP: No audible wheeze, cough, or visible cyanosis.  No visible retractions or increased work of breathing.    SKIN: Visible skin clear. No significant rash, abnormal pigmentation or lesions.  NEURO: Cranial nerves grossly intact.  Mentation and speech appropriate for  age.  PSYCH: Mentation appears normal, affect normal/bright, judgement and insight intact, normal speech and appearance well-groomed.    IN 72 HOURS           Video-Visit Details    Video Start Time: 12:20 PM    Type of service:  Video Visit    Video End Time:12:47 PM , pt did discontinue and then connecting again     Originating Location (pt. Location): Home    Distant Location (provider location):  [facility]     Platform used for Video Visit: Allison May MD

## 2022-10-09 ENCOUNTER — HOSPITAL ENCOUNTER (EMERGENCY)
Facility: CLINIC | Age: 37
Discharge: HOME | End: 2022-10-10
Attending: EMERGENCY MEDICINE | Admitting: EMERGENCY MEDICINE
Payer: COMMERCIAL

## 2022-10-09 DIAGNOSIS — H20.9 TRAUMATIC IRITIS: ICD-10-CM

## 2022-10-09 DIAGNOSIS — S05.12XA EYE CONTUSION, LEFT, INITIAL ENCOUNTER: ICD-10-CM

## 2022-10-09 DIAGNOSIS — S09.90XA INJURY OF HEAD, INITIAL ENCOUNTER: ICD-10-CM

## 2022-10-09 PROCEDURE — 99284 EMERGENCY DEPT VISIT MOD MDM: CPT | Mod: 25

## 2022-10-10 ENCOUNTER — APPOINTMENT (OUTPATIENT)
Dept: CT IMAGING | Facility: CLINIC | Age: 37
End: 2022-10-10
Attending: EMERGENCY MEDICINE
Payer: COMMERCIAL

## 2022-10-10 VITALS
TEMPERATURE: 97.8 F | HEART RATE: 90 BPM | SYSTOLIC BLOOD PRESSURE: 111 MMHG | RESPIRATION RATE: 18 BRPM | HEIGHT: 60 IN | BODY MASS INDEX: 36.52 KG/M2 | DIASTOLIC BLOOD PRESSURE: 79 MMHG | WEIGHT: 186 LBS | OXYGEN SATURATION: 99 %

## 2022-10-10 PROCEDURE — 250N000013 HC RX MED GY IP 250 OP 250 PS 637: Performed by: EMERGENCY MEDICINE

## 2022-10-10 PROCEDURE — 70486 CT MAXILLOFACIAL W/O DYE: CPT

## 2022-10-10 PROCEDURE — 70450 CT HEAD/BRAIN W/O DYE: CPT

## 2022-10-10 RX ORDER — IBUPROFEN 600 MG/1
600 TABLET, FILM COATED ORAL ONCE
Status: COMPLETED | OUTPATIENT
Start: 2022-10-10 | End: 2022-10-10

## 2022-10-10 RX ADMIN — IBUPROFEN 600 MG: 600 TABLET ORAL at 01:46

## 2022-10-10 ASSESSMENT — VISUAL ACUITY
OS: 20/50;WITH CORRECTIVE LENSES
OD: 20/20

## 2022-10-10 ASSESSMENT — ACTIVITIES OF DAILY LIVING (ADL): ADLS_ACUITY_SCORE: 35

## 2022-10-10 ASSESSMENT — ENCOUNTER SYMPTOMS
PHOTOPHOBIA: 1
EYE PAIN: 1
NAUSEA: 0
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
## 2022-10-11 ASSESSMENT — VISUAL ACUITY: OU: 1

## 2022-10-19 ENCOUNTER — E-VISIT (OUTPATIENT)
Dept: FAMILY MEDICINE | Facility: CLINIC | Age: 37
End: 2022-10-19
Payer: COMMERCIAL

## 2022-10-19 DIAGNOSIS — F39 MOOD DISORDER (H): Primary | ICD-10-CM

## 2022-10-19 PROCEDURE — 99207 PR NON-BILLABLE SERV PER CHARTING: CPT | Performed by: FAMILY MEDICINE

## 2022-10-19 NOTE — PATIENT INSTRUCTIONS
Thank you for choosing us for your care. I think an in-clinic visit would be best next steps based on your symptoms. Please schedule a clinic appointment; you won t be charged for this eVisit.      You can schedule an appointment right here in [facility], or call [phone]

## 2023-01-11 DIAGNOSIS — Z76.89 ENCOUNTER FOR WEIGHT MANAGEMENT: ICD-10-CM

## 2023-01-11 DIAGNOSIS — R05.9 COUGH: ICD-10-CM

## 2023-01-12 RX ORDER — FLUTICASONE PROPIONATE AND SALMETEROL 50; 250 UG/1; UG/1
POWDER RESPIRATORY (INHALATION)
Qty: 60 EACH | Refills: 3 | Status: SHIPPED | OUTPATIENT
Start: 2023-01-12 | End: 2024-09-04

## 2023-01-12 RX ORDER — PHENTERMINE HYDROCHLORIDE 30 MG/1
CAPSULE ORAL
Qty: 90 CAPSULE | Refills: 1 | Status: SHIPPED | OUTPATIENT
Start: 2023-01-12 | End: 2023-03-27

## 2023-01-22 ENCOUNTER — HOSPITAL ENCOUNTER (EMERGENCY)
Facility: CLINIC | Age: 38
Discharge: HOME | End: 2023-01-23
Attending: EMERGENCY MEDICINE | Admitting: EMERGENCY MEDICINE
Payer: COMMERCIAL

## 2023-01-22 DIAGNOSIS — U07.1 INFECTION DUE TO 2019 NOVEL CORONAVIRUS: ICD-10-CM

## 2023-01-22 DIAGNOSIS — O20.9 VAGINAL BLEEDING IN PREGNANCY, FIRST TRIMESTER: ICD-10-CM

## 2023-01-22 LAB
ABO/RH(D): NORMAL
ANTIBODY SCREEN: NEGATIVE
FLUAV RNA SPEC QL NAA+PROBE: NEGATIVE
FLUBV RNA RESP QL NAA+PROBE: NEGATIVE
GROUP A STREP BY PCR: NOT DETECTED
HCG UR QL: POSITIVE
RSV RNA SPEC NAA+PROBE: NEGATIVE
SARS-COV-2 RNA RESP QL NAA+PROBE: POSITIVE
SPECIMEN EXPIRATION DATE: NORMAL

## 2023-01-22 PROCEDURE — 87651 STREP A DNA AMP PROBE: CPT | Performed by: EMERGENCY MEDICINE

## 2023-01-22 PROCEDURE — 87637 SARSCOV2&INF A&B&RSV AMP PRB: CPT | Performed by: EMERGENCY MEDICINE

## 2023-01-22 PROCEDURE — 99285 EMERGENCY DEPT VISIT HI MDM: CPT | Mod: CS,25

## 2023-01-22 PROCEDURE — C9803 HOPD COVID-19 SPEC COLLECT: HCPCS

## 2023-01-22 PROCEDURE — 81025 URINE PREGNANCY TEST: CPT | Performed by: EMERGENCY MEDICINE

## 2023-01-22 ASSESSMENT — ACTIVITIES OF DAILY LIVING (ADL): ADLS_ACUITY_SCORE: 33

## 2023-01-22 NOTE — Clinical Note
Karuna Ann was seen and treated in our emergency department on 1/22/2023.  She may return to work on 01/30/2023.       If you have any questions or concerns, please don't hesitate to call.      Celine Brandt PA-C

## 2023-01-23 ENCOUNTER — APPOINTMENT (OUTPATIENT)
Dept: ULTRASOUND IMAGING | Facility: CLINIC | Age: 38
End: 2023-01-23
Attending: EMERGENCY MEDICINE
Payer: COMMERCIAL

## 2023-01-23 ENCOUNTER — PATIENT OUTREACH (OUTPATIENT)
Dept: CARE COORDINATION | Facility: CLINIC | Age: 38
End: 2023-01-23
Payer: COMMERCIAL

## 2023-01-23 VITALS
DIASTOLIC BLOOD PRESSURE: 85 MMHG | WEIGHT: 185 LBS | TEMPERATURE: 99.9 F | RESPIRATION RATE: 18 BRPM | OXYGEN SATURATION: 97 % | SYSTOLIC BLOOD PRESSURE: 140 MMHG | BODY MASS INDEX: 36.32 KG/M2 | HEIGHT: 60 IN | HEART RATE: 92 BPM

## 2023-01-23 LAB
ANION GAP SERPL CALCULATED.3IONS-SCNC: 9 MMOL/L (ref 5–18)
BASOPHILS # BLD AUTO: 0 10E3/UL (ref 0–0.2)
BASOPHILS NFR BLD AUTO: 0 %
BUN SERPL-MCNC: 7 MG/DL (ref 8–22)
CALCIUM SERPL-MCNC: 8.7 MG/DL (ref 8.5–10.5)
CHLORIDE BLD-SCNC: 107 MMOL/L (ref 98–107)
CO2 SERPL-SCNC: 23 MMOL/L (ref 22–31)
CREAT SERPL-MCNC: 0.7 MG/DL (ref 0.6–1.1)
EOSINOPHIL # BLD AUTO: 0 10E3/UL (ref 0–0.7)
EOSINOPHIL NFR BLD AUTO: 0 %
ERYTHROCYTE [DISTWIDTH] IN BLOOD BY AUTOMATED COUNT: 12.3 % (ref 10–15)
GFR SERPL CREATININE-BSD FRML MDRD: >90 ML/MIN/1.73M2
GLUCOSE BLD-MCNC: 101 MG/DL (ref 70–125)
HCG SERPL-ACNC: 565 MLU/ML (ref 0–4)
HCT VFR BLD AUTO: 39.4 % (ref 35–47)
HGB BLD-MCNC: 13.8 G/DL (ref 11.7–15.7)
IMM GRANULOCYTES # BLD: 0 10E3/UL
IMM GRANULOCYTES NFR BLD: 1 %
LYMPHOCYTES # BLD AUTO: 0.8 10E3/UL (ref 0.8–5.3)
LYMPHOCYTES NFR BLD AUTO: 21 %
MCH RBC QN AUTO: 28.9 PG (ref 26.5–33)
MCHC RBC AUTO-ENTMCNC: 35 G/DL (ref 31.5–36.5)
MCV RBC AUTO: 83 FL (ref 78–100)
MONOCYTES # BLD AUTO: 0.4 10E3/UL (ref 0–1.3)
MONOCYTES NFR BLD AUTO: 11 %
NEUTROPHILS # BLD AUTO: 2.6 10E3/UL (ref 1.6–8.3)
NEUTROPHILS NFR BLD AUTO: 67 %
NRBC # BLD AUTO: 0 10E3/UL
NRBC BLD AUTO-RTO: 0 /100
PLATELET # BLD AUTO: 237 10E3/UL (ref 150–450)
POTASSIUM BLD-SCNC: 3.3 MMOL/L (ref 3.5–5)
RBC # BLD AUTO: 4.77 10E6/UL (ref 3.8–5.2)
SODIUM SERPL-SCNC: 139 MMOL/L (ref 136–145)
WBC # BLD AUTO: 3.8 10E3/UL (ref 4–11)

## 2023-01-23 PROCEDURE — 84702 CHORIONIC GONADOTROPIN TEST: CPT | Performed by: EMERGENCY MEDICINE

## 2023-01-23 PROCEDURE — 80048 BASIC METABOLIC PNL TOTAL CA: CPT | Performed by: EMERGENCY MEDICINE

## 2023-01-23 PROCEDURE — 86901 BLOOD TYPING SEROLOGIC RH(D): CPT | Performed by: EMERGENCY MEDICINE

## 2023-01-23 PROCEDURE — 85025 COMPLETE CBC W/AUTO DIFF WBC: CPT | Performed by: EMERGENCY MEDICINE

## 2023-01-23 PROCEDURE — 36415 COLL VENOUS BLD VENIPUNCTURE: CPT | Performed by: EMERGENCY MEDICINE

## 2023-01-23 PROCEDURE — 76801 OB US < 14 WKS SINGLE FETUS: CPT

## 2023-01-23 ASSESSMENT — ENCOUNTER SYMPTOMS
ABDOMINAL PAIN: 0
HEMATURIA: 0
NAUSEA: 0
SHORTNESS OF BREATH: 0
DYSURIA: 0
CHILLS: 1
VOMITING: 1
COUGH: 1
SORE THROAT: 1
FEVER: 1
FATIGUE: 1
DIARRHEA: 0
MYALGIAS: 1

## 2023-01-23 ASSESSMENT — ACTIVITIES OF DAILY LIVING (ADL): ADLS_ACUITY_SCORE: 35
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
## 2023-01-23 NOTE — PROGRESS NOTES
Clinic Care Coordination Contact    Referral Type: Virtual Home Monitoring - Epic Care Companion    Patient referred for Virtual Home Monitoring Program for either COVID-19 or Community Acquired Pneumonia diagnosis following recent [facility] ED visit.  Epic Care Companion referral was also placed by provider.    Criteria for Virtual Home Monitoring telephone outreach is not met after review of ED encounter/ED provider note because:    1) ED provider note indicates assessment was negative for respiratory distress. O2 sats were stable throughout course of ED visit per chart review.      2) Patient was not discharged with new supplemental oxygen.    Per notes, ED provider and/or ED care team discussed appropriate follow up guidelines with patient prior to discharge or reflected these instructions on AVS.       Care Companion team remains available to support patient via Plink [employer] account once activated by patient.     Mary Mills RN  [facility]  - RN Care Coordinator

## 2023-01-23 NOTE — ED TRIAGE NOTES
Fever, sore throat, body aches, chills for 2 days.  Last tylenol was yesterday, theaflu about 4 hours ago.     Triage Assessment     Row Name 01/22/23 8638       Triage Assessment (Adult)    Airway WDL WDL       Respiratory WDL    Respiratory WDL WDL       Skin Circulation/Temperature WDL    Skin Circulation/Temperature WDL WDL       Cardiac WDL    Cardiac WDL WDL       Peripheral/Neurovascular WDL    Peripheral Neurovascular WDL WDL       Cognitive/Neuro/Behavioral WDL    Cognitive/Neuro/Behavioral WDL WDL

## 2023-01-23 NOTE — ED NOTES
"EMERGENCY DEPARTMENT SIGN OUT NOTE        ED COURSE AND MEDICAL DECISION MAKING  Patient was signed out to me by Celine Brandt PA-C at 12:24 AM   1:03 AM I rechecked the patient.  Discussed paxlovid treatment with patient which she would like to initiate.    In brief, Karuna Ann is a 37 year old female who initially presented with flu symptoms and vaginal bleeding     \"The patient reports that yesterday, she woke up with a fever which has persisted into today.  She endorses a sore throat, nasal congestion, and an intermittent cough.  She has also had a couple of episodes of emesis but notes this was related to coughing up phlegm and was only clear fluid.  Two children at home are sick with similar symptoms.  She has been taking Tylenol, ibuprofen, and TheraFlu with only minor improvement in her symptoms.  Patient initially attributed her symptoms to her menstrual cycle which also started yesterday, she notes that she was about 2-1/2 weeks with late which is abnormal for her-she is requesting a pregnancy test.\"    At time of sign out, disposition was pending lab, imaging results,recheck, and disposition    FINAL IMPRESSION    1. Infection due to 2019 novel coronavirus    2. Vaginal bleeding in pregnancy, first trimester        ED MEDS  Medications - No data to display    LAB  Labs Ordered and Resulted from Time of ED Arrival to Time of ED Departure   INFLUENZA A/B & SARS-COV2 PCR MULTIPLEX - Abnormal       Result Value    Influenza A PCR Negative      Influenza B PCR Negative      RSV PCR Negative      SARS CoV2 PCR Positive (*)    HCG QUALITATIVE URINE - Abnormal    hCG Urine Qualitative Positive (*)    BASIC METABOLIC PANEL - Abnormal    Sodium 139      Potassium 3.3 (*)     Chloride 107      Carbon Dioxide (CO2) 23      Anion Gap 9      Urea Nitrogen 7 (*)     Creatinine 0.70      Calcium 8.7      Glucose 101      GFR Estimate >90     HCG QUANTITATIVE PREGNANCY - Abnormal    hCG Quantitative 565 (*)    CBC " WITH PLATELETS AND DIFFERENTIAL - Abnormal    WBC Count 3.8 (*)     RBC Count 4.77      Hemoglobin 13.8      Hematocrit 39.4      MCV 83      MCH 28.9      MCHC 35.0      RDW 12.3      Platelet Count 237      % Neutrophils 67      % Lymphocytes 21      % Monocytes 11      % Eosinophils 0      % Basophils 0      % Immature Granulocytes 1      NRBCs per 100 WBC 0      Absolute Neutrophils 2.6      Absolute Lymphocytes 0.8      Absolute Monocytes 0.4      Absolute Eosinophils 0.0      Absolute Basophils 0.0      Absolute Immature Granulocytes 0.0      Absolute NRBCs 0.0     GROUP A STREPTOCOCCUS PCR THROAT SWAB - Normal    Group A strep by PCR Not Detected     TYPE AND SCREEN, ADULT    ABO/RH(D) AB POS      Antibody Screen Negative      SPECIMEN EXPIRATION DATE [id number]     ABO/RH TYPE AND SCREEN         RADIOLOGY    OB  US 1st trimester w transvag   Final Result   IMPRESSION:    3 mm cystic focus of the endometrium is likely a gestational sac at 4 weeks and 5 days. Clinical and ultrasound follow-up recommended.                DISCHARGE MEDS  New Prescriptions    NIRMATRELVIR AND RITONAVIR (PAXLOVID) THERAPY PACK    Take 3 tablets by mouth 2 times daily for 5 days       Lorena Red MD  [facility] EMERGENCY ROOM  [address] [id number] [phone]     Lorena Red MD  01/23/23 0117       Lorena Red MD  01/23/23 0117

## 2023-01-23 NOTE — ED NOTES
Discharge request from provider completed. Pt has no further questions. Pt alert and orientated x3. Reports feeling the same, but feels okay to go home.

## 2023-01-23 NOTE — DISCHARGE INSTRUCTIONS
You were seen here today for evaluation of fever and sore throat.  Your strep test was negative.  Your COVID-19 test was positive.  This is likely the cause of your symptoms.    You may take Tylenol and ibuprofen for pain/fever, do not exceed 4000 mg of Tylenol per day or 3200 mg ofibuprofen per day.    Please quarantine at home for 5 days (the first day that you had symptoms [place] is day 0).  Wear a mask in public after you have finished your 5-day quarantine for an additional 5 days.    Follow-up with your primary care provider in 1 week for recheck.  Return here for any new or worsening symptoms including severe pain, chest pain or difficulty breathing, fever despite Tylenol/ibuprofen, persistent vomiting, or any other symptoms that concern you.    Follow-up with your OB/GYN in 2 days for reevaluation of your vaginal bleeding and pregnancy and repeat hCG level.

## 2023-01-25 ENCOUNTER — LAB (OUTPATIENT)
Dept: LAB | Facility: CLINIC | Age: 38
End: 2023-01-25
Payer: COMMERCIAL

## 2023-01-25 ENCOUNTER — TELEPHONE (OUTPATIENT)
Dept: FAMILY MEDICINE | Facility: CLINIC | Age: 38
End: 2023-01-25

## 2023-01-25 ENCOUNTER — VIRTUAL VISIT (OUTPATIENT)
Dept: FAMILY MEDICINE | Facility: CLINIC | Age: 38
End: 2023-01-25
Payer: COMMERCIAL

## 2023-01-25 DIAGNOSIS — U07.1 INFECTION DUE TO 2019 NOVEL CORONAVIRUS: ICD-10-CM

## 2023-01-25 DIAGNOSIS — E66.01 MORBID OBESITY (H): ICD-10-CM

## 2023-01-25 DIAGNOSIS — J45.30 MILD PERSISTENT ASTHMA WITHOUT COMPLICATION: ICD-10-CM

## 2023-01-25 DIAGNOSIS — Z32.01 PREGNANCY TEST POSITIVE: ICD-10-CM

## 2023-01-25 DIAGNOSIS — K21.9 CHRONIC GERD: ICD-10-CM

## 2023-01-25 DIAGNOSIS — Z32.01 PREGNANCY TEST POSITIVE: Primary | ICD-10-CM

## 2023-01-25 LAB — HCG INTACT+B SERPL-ACNC: 395 MIU/ML

## 2023-01-25 PROCEDURE — 84702 CHORIONIC GONADOTROPIN TEST: CPT

## 2023-01-25 PROCEDURE — 36415 COLL VENOUS BLD VENIPUNCTURE: CPT

## 2023-01-25 PROCEDURE — 99214 OFFICE O/P EST MOD 30 MIN: CPT | Mod: CS

## 2023-01-25 ASSESSMENT — ASTHMA QUESTIONNAIRES
EMERGENCY_ROOM_LAST_YEAR_TOTAL: ONE
QUESTION_1 LAST FOUR WEEKS HOW MUCH OF THE TIME DID YOUR ASTHMA KEEP YOU FROM GETTING AS MUCH DONE AT WORK, SCHOOL OR AT HOME: A LITTLE OF THE TIME
QUESTION_5 LAST FOUR WEEKS HOW WOULD YOU RATE YOUR ASTHMA CONTROL: WELL CONTROLLED
ACT_TOTALSCORE: 21
QUESTION_4 LAST FOUR WEEKS HOW OFTEN HAVE YOU USED YOUR RESCUE INHALER OR NEBULIZER MEDICATION (SUCH AS ALBUTEROL): ONCE A WEEK OR LESS
QUESTION_3 LAST FOUR WEEKS HOW OFTEN DID YOUR ASTHMA SYMPTOMS (WHEEZING, COUGHING, SHORTNESS OF BREATH, CHEST TIGHTNESS OR PAIN) WAKE YOU UP AT NIGHT OR EARLIER THAN USUAL IN THE MORNING: NOT AT ALL
QUESTION_2 LAST FOUR WEEKS HOW OFTEN HAVE YOU HAD SHORTNESS OF BREATH: ONCE OR TWICE A WEEK
ACT_TOTALSCORE: 21

## 2023-01-25 ASSESSMENT — PATIENT HEALTH QUESTIONNAIRE - PHQ9
SUM OF ALL RESPONSES TO PHQ QUESTIONS 1-9: 10
10. IF YOU CHECKED OFF ANY PROBLEMS, HOW DIFFICULT HAVE THESE PROBLEMS MADE IT FOR YOU TO DO YOUR WORK, TAKE CARE OF THINGS AT HOME, OR GET ALONG WITH OTHER PEOPLE: SOMEWHAT DIFFICULT
SUM OF ALL RESPONSES TO PHQ QUESTIONS 1-9: 10

## 2023-01-25 NOTE — PROGRESS NOTES
Karuna is a 37 year old who is being evaluated via a billable video visit.      How would you like to obtain your AVS? MyChart  If the video visit is dropped, the invitation should be resent by: Text to cell phone: [phone]  Will anyone else be joining your video visit? No        Assessment & Plan     Pregnancy test positive  Repeat lab, if increasing plan to treat as normal pregnancy, schedule with PCP/OBGYN. If abnormal will follow up with instructions.  - HCG quantitative pregnancy    Infection due to 2019 novel coronavirus  Acute, improving. Continue paxlovid.     Morbid obesity (H)  Chronic.   - stop taking topiramate  - stop taking phentermine    Mild persistent asthma without complication  Chronic. OK to use ventolin prn while pregnant. OK to keep holding ADvair due to paxlovid interaction.     Chronic GERD  Chronic. OK to continue famotidine  Prn while pregnant.              MED REC REQUIRED  Post Medication Reconciliation Status: patient was not discharged from an inpatient facility or TCU      PATIENT INSTRUCTIONS  Schedule with your PCP/OBGYN for prenatal care.   Schedule lab appointment to recheck HCG level.   Stop topiramatte and phentermine, and tizanidine.  OK to take ventolin, famotidine, cetirizine as needed.   Continue holding Advair while on Paxlovid.    Schedule with Dr. May for prenatal care.   Schedule lab appointment to get HCG blood test repeated.  Increase potassium in your diet     No follow-ups on file.    ARTIE Bloom [facility]    Carla Beckman is a 37 year old, presenting for the following health issues:  positive covid f/u       HPI     ED/UC Followup:    Facility:  [facility]  Date of visit: 1/22/23  Reason for visit: Positive Covid; vaginal bleeding first trimester in pregnancy   Current Status: Improved flu symptoms, unchanged vaginal bleeding.     ER with flu-like symptoms, fever. Had + covid and + urine pregnancy with pelvic US.  Treated with paxlovid. Since then her flu symptoms have improved.     LMP 12/7. 7 weeks pregnant.    Still having vaginal bleed, getting lighter. Bright red, constant, using tampons. No pelvic pain. + breast tenderness, not having much nausea - some vomiting from gagging.   No clots, no fatigue. No pelvic cramping.     Some loose stools with paxlovid.     Endorses she had some bleeding at beginning of prior pregnancies.   1 miscarriage before, when she was 18, none since then.       Current Outpatient Medications   Medication     ADVAIR DISKUS 250-50 MCG/ACT inhaler     cetirizine (ZYRTEC) 10 MG tablet     famotidine (PEPCID) 20 MG tablet     fluticasone (FLONASE) 50 MCG/ACT nasal spray     ibuprofen (ADVIL/MOTRIN) 600 MG tablet     nirmatrelvir and ritonavir (PAXLOVID) therapy pack     phentermine (ADIPEX-P) 30 MG capsule     phentermine (ADIPEX-P) 37.5 MG capsule     tiZANidine (ZANAFLEX) 4 MG tablet     topiramate (TOPAMAX) 100 MG tablet     VENTOLIN  (90 Base) MCG/ACT inhaler     vitamin D3 (CHOLECALCIFEROL) 50 mcg (2000 units) tablet     No current facility-administered medications for this visit.             Review of Systems   Constitutional, HEENT, cardiovascular, pulmonary, gi and gu systems are negative, except as otherwise noted.      Objective           Vitals:  No vitals were obtained today due to virtual visit.    Physical Exam   GENERAL: Healthy, alert and no distress  EYES: Eyes grossly normal to inspection.  No discharge or erythema, or obvious scleral/conjunctival abnormalities.  RESP: No audible wheeze, cough, or visible cyanosis.  No visible retractions or increased work of breathing.    SKIN: Visible skin clear. No significant rash, abnormal pigmentation or lesions.  NEURO: Cranial nerves grossly intact.  Mentation and speech appropriate for age.  PSYCH: Mentation appears normal, affect normal/bright, judgement and insight intact, normal speech and appearance well-groomed.    Admission on  01/22/2023, Discharged on 01/23/2023   Component Date Value Ref Range Status     Influenza A PCR 01/22/2023 Negative  Negative Final     Influenza B PCR 01/22/2023 Negative  Negative Final     RSV PCR 01/22/2023 Negative  Negative Final     SARS CoV2 PCR 01/22/2023 Positive (A)  Negative Final    POSITIVE: SARS-CoV-2 (COVID-19) RNA detected, presumed positive.     Group A strep by PCR 01/22/2023 Not Detected  Not Detected Final     hCG Urine Qualitative 01/22/2023 Positive (A)  Negative Final    This test is for screening purposes.  Results should be interpreted along with the clinical picture.  Confirmation testing is available if warranted by ordering [id number], HCG Quantitative Pregnancy.     Sodium 01/23/2023 139  136 - 145 mmol/L Final     Potassium 01/23/2023 3.3 (L)  3.5 - 5.0 mmol/L Final     Chloride 01/23/2023 107  98 - 107 mmol/L Final     Carbon Dioxide (CO2) 01/23/2023 23  22 - 31 mmol/L Final     Anion Gap 01/23/2023 9  5 - 18 mmol/L Final     Urea Nitrogen 01/23/2023 7 (L)  8 - 22 mg/dL Final     Creatinine 01/23/2023 0.70  0.60 - 1.10 mg/dL Final     Calcium 01/23/2023 8.7  8.5 - 10.5 mg/dL Final     Glucose 01/23/2023 101  70 - 125 mg/dL Final     GFR Estimate 01/23/2023 >90  >60 mL/min/1.73m2 Final    Effective December 21, 2021 eGFRcr in adults is calculated using the 2021 CKD-EPI creatinine equation which includes age and gender (Jaylen et al., [place], DOI: 10.1056/XFBZqk1844839)     hCG Quantitative 01/23/2023 565 (H)  0 - 4 mlU/mL Final    Non-pregnant female . . . . . . . . . . . . . 0-4 (<5) mIU/mL  Equivocal result for early pregnancy . . . . 5-24 mIU/mL  Values in pregnancy should double every 2-3 days for the first 6 weeks. Patients with very low levels of hCG should be resampled and retested after 48 hours.   For diagnostic purposes, hCG results should be used   conjunction with other data.   If the hCG level is inconsistent with clinical evidence,   results should be confirmed by an alternate  hCG method.   This assay is approved for use in the early detection   of pregnancy only. It is not approved for any other   uses such as tumor marker screening or monitoring.     WBC Count 01/23/2023 3.8 (L)  4.0 - 11.0 10e3/uL Final     RBC Count 01/23/2023 4.77  3.80 - 5.20 10e6/uL Final     Hemoglobin 01/23/2023 13.8  11.7 - 15.7 g/dL Final     Hematocrit 01/23/2023 39.4  35.0 - 47.0 % Final     MCV 01/23/2023 83  78 - 100 fL Final     MCH 01/23/2023 28.9  26.5 - 33.0 pg Final     MCHC 01/23/2023 35.0  31.5 - 36.5 g/dL Final     RDW 01/23/2023 12.3  10.0 - 15.0 % Final     Platelet Count 01/23/2023 237  150 - 450 10e3/uL Final     % Neutrophils 01/23/2023 67  % Final     % Lymphocytes 01/23/2023 21  % Final     % Monocytes 01/23/2023 11  % Final     % Eosinophils 01/23/2023 0  % Final     % Basophils 01/23/2023 0  % Final     % Immature Granulocytes 01/23/2023 1  % Final     NRBCs per 100 WBC 01/23/2023 0  <1 /100 Final     Absolute Neutrophils 01/23/2023 2.6  1.6 - 8.3 10e3/uL Final     Absolute Lymphocytes 01/23/2023 0.8  0.8 - 5.3 10e3/uL Final     Absolute Monocytes 01/23/2023 0.4  0.0 - 1.3 10e3/uL Final     Absolute Eosinophils 01/23/2023 0.0  0.0 - 0.7 10e3/uL Final     Absolute Basophils 01/23/2023 0.0  0.0 - 0.2 10e3/uL Final     Absolute Immature Granulocytes 01/23/2023 0.0  <=0.4 10e3/uL Final     Absolute NRBCs 01/23/2023 0.0  10e3/uL Final     ABO/RH(D) 01/23/2023 AB POS   Final     Antibody Screen 01/23/2023 Negative  Negative Final     SPECIMEN EXPIRATION DATE 01/23/2023 [id number]   Final               Video-Visit Details    Type of service:  Video Visit   Video Start Time: 2:38 PM  Video End Time:2:58 PM    Originating Location (pt. Location): Home  Distant Location (provider location):  On-site  Platform used for Video Visit: [employer]    Answers for HPI/ROS submitted by the patient on 1/25/2023  If you checked off any problems, how difficult have these problems made it for you to do your work,  take care of things at home, or get along with other people?: Somewhat difficult  PHQ9 TOTAL SCORE: 10

## 2023-01-25 NOTE — PATIENT INSTRUCTIONS
Stop topiramatte and phentermine, and tizanidine.  OK to take ventolin, famotidine, cetirizine as needed.   Continue holding Advair while on Paxlovid.    Schedule with Dr. May for prenatal care.   Schedule lab appointment to get HCG blood test repeated.  Increase potassium in your diet     See attached handouts on pregnancy and asthma, HCG lab info, and common causes of vaginal bleeding in early pregnancy.

## 2023-01-25 NOTE — TELEPHONE ENCOUNTER
Attempt #1 to call patient.     RN left voicemail and requested return call to [facility] at [phone].     Annette Abreu RN  [facility]: [place]    When reading through pt chart.     positive COVID- paxlovid given for treatment    Positive pregnancy test- pt has bleeding and cramping     F/U on HCG draw to find why pt is bleeding and cramping?    Pt should call PCP to get order placed for HCG and pt can go to any [place] clinic to have drawn.    Annette Abreu RN            Please call patient and see what this visit is for at 2:30, was just added to my schedule. I don't do prenatal care. Would be best to have patient call the RN triage from her PCP Dr. May to have ER follow up.   jewell

## 2023-01-26 ENCOUNTER — E-VISIT (OUTPATIENT)
Dept: PEDIATRICS | Facility: CLINIC | Age: 38
End: 2023-01-26
Payer: COMMERCIAL

## 2023-01-26 ENCOUNTER — TELEPHONE (OUTPATIENT)
Dept: FAMILY MEDICINE | Facility: CLINIC | Age: 38
End: 2023-01-26

## 2023-01-26 DIAGNOSIS — O02.81 INAPPROPRIATE CHANGE IN QUANTITATIVE HCG IN EARLY PREGNANCY: Primary | ICD-10-CM

## 2023-01-26 DIAGNOSIS — N93.9 VAGINAL BLEEDING: Primary | ICD-10-CM

## 2023-01-26 PROCEDURE — 99207 PR NON-BILLABLE SERV PER CHARTING: CPT

## 2023-01-26 NOTE — TELEPHONE ENCOUNTER
Pt is currently communicating with [facility] provider from 01/25/2023 virtual visit to address concerns. HCG test ordered and is pending.     Christy Russell RN

## 2023-01-26 NOTE — TELEPHONE ENCOUNTER
Spoke with patient over the phone at 5:00 PM and discussed information in my note below.     Patient likely having a miscarriage based on down trending HCG, vaginal bleeding, and ultrasound in ER showed intrauterine gestational sac with no yolk sac, we will recheck serum HCG tomorrow to confirm it is decreasing. Patient will call to schedule lab appointment and I will call her with results. Patient verbalized understanding.

## 2023-01-26 NOTE — TELEPHONE ENCOUNTER
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

## 2023-01-26 NOTE — TELEPHONE ENCOUNTER
Called patient and LM on VM at 2:59 PM.    Saw pt for virtual appt yesterday to follow up on her ER visit, asked her to call back to discuss results.     RN Triage could discuss with patient or if she wants to discuss with me she can be added to my schedule at 4:00 PM Today as a phone visit (We did discuss possibility of miscarriage during the virtual visit yesterday).     Ultrasound from ER visit showed gestational sac with no yolk sac, which either means it was an early pregnancy, or a non-viable pregnancy/ongoing miscarriage.     Her blood HCG level at ER was 565  Her blood HCG level rechecked yesterday was 395, which is decreasing from the ER visit.    The decreasing HCG and vaginal bleeding is consistent with a non-viable pregnancy/ miscarriage. If this is the case she could expect that her bleeding will likely progress from light spotting to heavier bleeding with some cramping. This is not an emergency and typically slightly more bleeding/cramping than a typical period, resolves on its own without any need for procedure or medication.      I have ordered repeat HCG to repeat tomorrow so we can confirm the level is decreasing. She can call to schedule a lab visit and I will follow up with her based on results. She can also schedule a follow up with her PCP in the coming weeks.     Primitivo

## 2023-01-26 NOTE — TELEPHONE ENCOUNTER
Patient returned call to Provider Primitivo Hanley CNP    Patient will be available for call back [phone]      Lissy Truong RN

## 2023-03-27 ENCOUNTER — OFFICE VISIT (OUTPATIENT)
Dept: FAMILY MEDICINE | Facility: CLINIC | Age: 38
End: 2023-03-27
Payer: COMMERCIAL

## 2023-03-27 VITALS
WEIGHT: 193 LBS | BODY MASS INDEX: 37.89 KG/M2 | HEIGHT: 60 IN | SYSTOLIC BLOOD PRESSURE: 120 MMHG | DIASTOLIC BLOOD PRESSURE: 60 MMHG | HEART RATE: 76 BPM | OXYGEN SATURATION: 98 %

## 2023-03-27 DIAGNOSIS — E66.01 MORBID OBESITY (H): ICD-10-CM

## 2023-03-27 DIAGNOSIS — Z13.228 SCREENING FOR METABOLIC DISORDER: ICD-10-CM

## 2023-03-27 DIAGNOSIS — Z87.59 MISCARRIAGE WITHIN LAST 12 MONTHS: ICD-10-CM

## 2023-03-27 DIAGNOSIS — M54.50 CHRONIC BILATERAL LOW BACK PAIN WITHOUT SCIATICA: ICD-10-CM

## 2023-03-27 DIAGNOSIS — E55.9 VITAMIN D DEFICIENCY: ICD-10-CM

## 2023-03-27 DIAGNOSIS — G89.29 CHRONIC BILATERAL LOW BACK PAIN WITHOUT SCIATICA: ICD-10-CM

## 2023-03-27 DIAGNOSIS — Z30.09 BIRTH CONTROL COUNSELING: ICD-10-CM

## 2023-03-27 DIAGNOSIS — F43.10 PTSD (POST-TRAUMATIC STRESS DISORDER): ICD-10-CM

## 2023-03-27 DIAGNOSIS — Z76.89 ENCOUNTER FOR WEIGHT MANAGEMENT: ICD-10-CM

## 2023-03-27 DIAGNOSIS — F39 MOOD DISORDER (H): ICD-10-CM

## 2023-03-27 DIAGNOSIS — Z00.01 ENCOUNTER FOR ROUTINE ADULT MEDICAL EXAM WITH ABNORMAL FINDINGS: Primary | ICD-10-CM

## 2023-03-27 DIAGNOSIS — J45.30 MILD PERSISTENT ASTHMA WITHOUT COMPLICATION: ICD-10-CM

## 2023-03-27 DIAGNOSIS — F40.218: ICD-10-CM

## 2023-03-27 PROBLEM — G93.0 CYST OF BRAIN: Status: RESOLVED | Noted: 2019-10-25 | Resolved: 2023-03-27

## 2023-03-27 LAB
CHOLEST SERPL-MCNC: 152 MG/DL
HBA1C MFR BLD: 5.5 % (ref 0–5.6)
HCG UR QL: NEGATIVE
HDLC SERPL-MCNC: 57 MG/DL
LDLC SERPL CALC-MCNC: 86 MG/DL
NONHDLC SERPL-MCNC: 95 MG/DL
TRIGL SERPL-MCNC: 43 MG/DL

## 2023-03-27 PROCEDURE — 80061 LIPID PANEL: CPT | Performed by: FAMILY MEDICINE

## 2023-03-27 PROCEDURE — 99395 PREV VISIT EST AGE 18-39: CPT | Performed by: FAMILY MEDICINE

## 2023-03-27 PROCEDURE — 81025 URINE PREGNANCY TEST: CPT | Performed by: FAMILY MEDICINE

## 2023-03-27 PROCEDURE — 99214 OFFICE O/P EST MOD 30 MIN: CPT | Mod: 25 | Performed by: FAMILY MEDICINE

## 2023-03-27 PROCEDURE — 83036 HEMOGLOBIN GLYCOSYLATED A1C: CPT | Performed by: FAMILY MEDICINE

## 2023-03-27 PROCEDURE — 36415 COLL VENOUS BLD VENIPUNCTURE: CPT | Performed by: FAMILY MEDICINE

## 2023-03-27 PROCEDURE — 82306 VITAMIN D 25 HYDROXY: CPT | Performed by: FAMILY MEDICINE

## 2023-03-27 RX ORDER — TOPIRAMATE 100 MG/1
100 TABLET, FILM COATED ORAL DAILY
Qty: 90 TABLET | Refills: 3 | Status: SHIPPED | OUTPATIENT
Start: 2023-03-27 | End: 2024-07-01

## 2023-03-27 RX ORDER — ERGOCALCIFEROL 1.25 MG/1
50000 CAPSULE, LIQUID FILLED ORAL WEEKLY
COMMUNITY
Start: 2023-01-12 | End: 2023-07-01

## 2023-03-27 RX ORDER — PHENTERMINE HYDROCHLORIDE 30 MG/1
CAPSULE ORAL
COMMUNITY
End: 2023-03-27

## 2023-03-27 RX ORDER — ALBUTEROL SULFATE 90 UG/1
2 AEROSOL, METERED RESPIRATORY (INHALATION) EVERY 6 HOURS PRN
Qty: 18 G | Refills: 1 | Status: SHIPPED | OUTPATIENT
Start: 2023-03-27

## 2023-03-27 ASSESSMENT — PATIENT HEALTH QUESTIONNAIRE - PHQ9
SUM OF ALL RESPONSES TO PHQ QUESTIONS 1-9: 7
SUM OF ALL RESPONSES TO PHQ QUESTIONS 1-9: 7
10. IF YOU CHECKED OFF ANY PROBLEMS, HOW DIFFICULT HAVE THESE PROBLEMS MADE IT FOR YOU TO DO YOUR WORK, TAKE CARE OF THINGS AT HOME, OR GET ALONG WITH OTHER PEOPLE: SOMEWHAT DIFFICULT
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
## 2023-03-28 LAB — DEPRECATED CALCIDIOL+CALCIFEROL SERPL-MC: 10 UG/L (ref 20–75)

## 2023-04-19 ENCOUNTER — APPOINTMENT (OUTPATIENT)
Dept: URGENT CARE | Facility: URGENT CARE | Age: 38
End: 2023-04-19
Payer: COMMERCIAL

## 2023-06-21 ENCOUNTER — E-VISIT (OUTPATIENT)
Dept: URGENT CARE | Facility: CLINIC | Age: 38
End: 2023-06-21
Payer: COMMERCIAL

## 2023-06-21 DIAGNOSIS — Z53.9 DIAGNOSIS NOT YET DEFINED: Primary | ICD-10-CM

## 2023-06-21 NOTE — PATIENT INSTRUCTIONS
Dear Karuna Ann,    We are sorry you are not feeling well. Based on the responses you provided, it is recommended that you be seen in-person in urgent care so we can better evaluate your symptoms. Please click here to find the nearest urgent care location to you.   You will not be charged for this Visit. Thank you for trusting us with your care.    Cassie Resendiz PA-C

## 2023-06-30 DIAGNOSIS — Z76.89 ENCOUNTER FOR WEIGHT MANAGEMENT: ICD-10-CM

## 2023-07-01 RX ORDER — PHENTERMINE HYDROCHLORIDE 30 MG/1
CAPSULE ORAL
Qty: 90 CAPSULE | Refills: 1 | Status: SHIPPED | OUTPATIENT
Start: 2023-07-01 | End: 2024-07-01

## 2023-07-01 RX ORDER — ERGOCALCIFEROL 1.25 MG/1
CAPSULE, LIQUID FILLED ORAL
Qty: 24 CAPSULE | Refills: 1 | Status: SHIPPED | OUTPATIENT
Start: 2023-07-01

## 2023-08-01 NOTE — TELEPHONE ENCOUNTER
"Routing refill request to provider for review/approval because:  Drug not active on patient's medication list    Last Written Prescription Date:    Last Fill Quantity: ,  # refills:    Last office visit provider:  3/24/22     Requested Prescriptions   Pending Prescriptions Disp Refills     topiramate (TOPAMAX) 50 MG tablet [Pharmacy Med Name: TOPIRAMATE 50MG TABLETS] 30 tablet 0     Sig: TAKE 1 TABLET(50 MG) BY MOUTH DAILY       Anti-Seizure Meds Protocol  Failed - 5/14/2022  9:21 AM        Failed - Review Authorizing provider's last note.      Refer to last progress notes: confirm request is for original authorizing provider (cannot be through other providers).          Failed - Normal CBC on file in past 26 months     Recent Labs   Lab Test 05/20/21  1425 01/14/21  0812 11/07/20  0255   WBC  --   --  6.2   RBC  --   --  4.12   HGB 12.1   < > 11.4*   HCT  --   --  33.8*   PLT  --   --  201    < > = values in this interval not displayed.                 Passed - Recent (12 mo) or future (30 days) visit within the authorizing provider's specialty     Patient has had an office visit with the authorizing provider or a provider within the authorizing providers department within the previous 12 mos or has a future within next 30 days. See \"Patient Info\" tab in inbasket, or \"Choose Columns\" in Meds & Orders section of the refill encounter.              Passed - Normal ALT or AST on file in past 26 months     Recent Labs   Lab Test 11/07/20  0255   ALT 19     Recent Labs   Lab Test 11/07/20  0255   AST 13             Passed - Normal platelet count on file in past 26 months     Recent Labs   Lab Test 11/07/20  0255                  Passed - Medication is active on med list        Passed - No active pregnancy on record        Passed - No positive pregnancy test in last 12 months             Avelina Andres, RN 05/16/22 10:58 AM  " [address] [place] [address]  ED  EMERGENCY DEPARTMENT ENCOUNTER        Patient Name: Nuno Martinez  MRN: [id number]  [facility] 1987  Date of evaluation: 8/1/2023  Provider: Zia Reyes MD  PCP: Silvia Bhat DO  Note Started: 3:48 PM EDT 8/1/23    CHIEF COMPLAINT       Abdominal Pain (Whilst sitting up in her bed last night, the pt became nauseous and developed generalized ABD pain that has an increased severity in the lower ABD. Pt has Hx of endometriosis, partial left nephrectomy, appendectomy, cholecystectomy. Increased urinary frequency. Pt denies diarrhea, emesis, dysuria, urgency, hematuria, fever. Pain worsened by straining.  )      HISTORY OF PRESENT ILLNESS: 1 or more Elements     History from : Patient    Limitations to history : None    Nuno Martinez is a 31 y.o. female who presents for evaluation of lower abdominal pain. She states that she had lower abdominal pain, bilateral in nature that started last night. This does seem to be worse with a bowel movement. She has had multiple bowel movements today. She has had prior abdominal surgeries including partial left nephrectomy, appendectomy, cholecystectomy. She has had some urinary frequency. No fevers. She has had mild nausea. She does have past history of endometriosis. Nursing Notes were all reviewed and agreed with or any disagreements were addressed in the HPI. REVIEW OF SYSTEMS :      Review of Systems    Positives and Pertinent negatives as per HPI.      SURGICAL HISTORY     Past Surgical History:   Procedure Laterality Date    ADENOIDECTOMY      APPENDECTOMY  2005    BREAST SURGERY Right 08/29/2019    EXPLORATION AND DEBRIDEMENT CHRONIC RIGHT BREAST ABSCESS performed by Josue Edwards MD at [facility]  08/2012    endometrosis    COLONOSCOPY      DILATION AND CURETTAGE OF UTERUS N/A 06/25/2020    DIAGNOSTIC LAPAROSCOPY,  DILATATION AND CURETTAGE, HYSTEROSCOPY  CPT CODE - 98000 performed by Jerrica Colon

## 2024-02-26 ENCOUNTER — PATIENT OUTREACH (OUTPATIENT)
Dept: CARE COORDINATION | Facility: CLINIC | Age: 39
End: 2024-02-26
Payer: COMMERCIAL

## 2024-02-29 ENCOUNTER — MYC REFILL (OUTPATIENT)
Dept: FAMILY MEDICINE | Facility: CLINIC | Age: 39
End: 2024-02-29
Payer: COMMERCIAL

## 2024-02-29 DIAGNOSIS — J45.30 MILD PERSISTENT ASTHMA WITHOUT COMPLICATION: ICD-10-CM

## 2024-02-29 DIAGNOSIS — Z76.89 ENCOUNTER FOR WEIGHT MANAGEMENT: ICD-10-CM

## 2024-02-29 RX ORDER — PHENTERMINE HYDROCHLORIDE 30 MG/1
30 CAPSULE ORAL EVERY MORNING
Qty: 90 CAPSULE | Refills: 1 | OUTPATIENT
Start: 2024-02-29

## 2024-02-29 RX ORDER — TOPIRAMATE 100 MG/1
100 TABLET, FILM COATED ORAL DAILY
Qty: 90 TABLET | Refills: 3 | OUTPATIENT
Start: 2024-02-29

## 2024-02-29 RX ORDER — ALBUTEROL SULFATE 90 UG/1
2 AEROSOL, METERED RESPIRATORY (INHALATION) EVERY 6 HOURS PRN
Qty: 18 G | Refills: 1 | OUTPATIENT
Start: 2024-02-29

## 2024-02-29 NOTE — TELEPHONE ENCOUNTER
Due for an appointment, please call and assist the patient in scheduling this appointment (annual exam if due, otherwise med check video visit or EVISIT for the med refills on [facility])

## 2024-03-04 PROBLEM — Z86.16 HISTORY OF 2019 NOVEL CORONAVIRUS DISEASE (COVID-19): Status: RESOLVED | Noted: 2020-09-03 | Resolved: 2024-03-04

## 2024-03-11 ENCOUNTER — PATIENT OUTREACH (OUTPATIENT)
Dept: CARE COORDINATION | Facility: CLINIC | Age: 39
End: 2024-03-11
Payer: COMMERCIAL

## 2024-04-29 ENCOUNTER — TELEPHONE (OUTPATIENT)
Dept: FAMILY MEDICINE | Facility: CLINIC | Age: 39
End: 2024-04-29
Payer: COMMERCIAL

## 2024-04-29 NOTE — TELEPHONE ENCOUNTER
,    Please call patient to schedule a follow up visit with Dr. May    Appointment:  Last office visit with PCP: 3/27/2023  Due for: PREVENTATIVE ADULT     Emir Farr RN    [facility]   Diagnosis Recommendations - Please confirm (Removal of dx)   Or consider (Adding dx) the following:    -UNSPECIFIED MOOD [AFFECTIVE] DISORDER  SUSANA-7 SCORE 9/3/2020   Total Score 17  Last PHQ-9 3/27/2023    Total Score 7     -MILD PERSISTENT ASTHMA, UNCOMPLICATED  Patient prescribed albuterol (VENTOLIN HFA) 108 (90 Base) MCG/ACT inhaler on 3/27/2023     -GASTRO-ESOPHAGEAL REFLUX DISEASE WITHOUT ESOPHAGITIS  famotidine (PEPCID) 20 MG tablet - patient reported.

## 2024-05-11 ENCOUNTER — HEALTH MAINTENANCE LETTER (OUTPATIENT)
Age: 39
End: 2024-05-11

## 2024-07-01 ENCOUNTER — OFFICE VISIT (OUTPATIENT)
Dept: FAMILY MEDICINE | Facility: CLINIC | Age: 39
End: 2024-07-01
Payer: COMMERCIAL

## 2024-07-01 VITALS
OXYGEN SATURATION: 99 % | BODY MASS INDEX: 45.07 KG/M2 | DIASTOLIC BLOOD PRESSURE: 78 MMHG | TEMPERATURE: 97.4 F | WEIGHT: 229.6 LBS | SYSTOLIC BLOOD PRESSURE: 114 MMHG | HEART RATE: 73 BPM | HEIGHT: 60 IN

## 2024-07-01 DIAGNOSIS — Z76.89 ENCOUNTER FOR WEIGHT MANAGEMENT: ICD-10-CM

## 2024-07-01 DIAGNOSIS — F42.2 MIXED OBSESSIONAL THOUGHTS AND ACTS: ICD-10-CM

## 2024-07-01 DIAGNOSIS — J45.30 MILD PERSISTENT ASTHMA WITHOUT COMPLICATION: ICD-10-CM

## 2024-07-01 DIAGNOSIS — F33.1 MODERATE EPISODE OF RECURRENT MAJOR DEPRESSIVE DISORDER (H): ICD-10-CM

## 2024-07-01 DIAGNOSIS — Z00.01 ENCOUNTER FOR ROUTINE ADULT MEDICAL EXAM WITH ABNORMAL FINDINGS: Primary | ICD-10-CM

## 2024-07-01 DIAGNOSIS — E66.01 MORBID OBESITY (H): ICD-10-CM

## 2024-07-01 DIAGNOSIS — F39 MOOD DISORDER (H): ICD-10-CM

## 2024-07-01 DIAGNOSIS — Z13.228 SCREENING FOR METABOLIC DISORDER: ICD-10-CM

## 2024-07-01 LAB
ALBUMIN SERPL BCG-MCNC: 4 G/DL (ref 3.5–5.2)
ALP SERPL-CCNC: 79 U/L (ref 40–150)
ALT SERPL W P-5'-P-CCNC: 41 U/L (ref 0–50)
ANION GAP SERPL CALCULATED.3IONS-SCNC: 8 MMOL/L (ref 7–15)
AST SERPL W P-5'-P-CCNC: 31 U/L (ref 0–45)
BILIRUB SERPL-MCNC: 0.4 MG/DL
BUN SERPL-MCNC: 9.2 MG/DL (ref 6–20)
CALCIUM SERPL-MCNC: 8.4 MG/DL (ref 8.6–10)
CHLORIDE SERPL-SCNC: 105 MMOL/L (ref 98–107)
CHOLEST SERPL-MCNC: 146 MG/DL
CREAT SERPL-MCNC: 0.63 MG/DL (ref 0.51–0.95)
DEPRECATED HCO3 PLAS-SCNC: 24 MMOL/L (ref 22–29)
EGFRCR SERPLBLD CKD-EPI 2021: >90 ML/MIN/1.73M2
FASTING STATUS PATIENT QL REPORTED: YES
FASTING STATUS PATIENT QL REPORTED: YES
GLUCOSE SERPL-MCNC: 108 MG/DL (ref 70–99)
HBA1C MFR BLD: 5.7 % (ref 0–5.6)
HDLC SERPL-MCNC: 52 MG/DL
LDLC SERPL CALC-MCNC: 82 MG/DL
NONHDLC SERPL-MCNC: 94 MG/DL
POTASSIUM SERPL-SCNC: 4.1 MMOL/L (ref 3.4–5.3)
PROT SERPL-MCNC: 7.1 G/DL (ref 6.4–8.3)
SODIUM SERPL-SCNC: 137 MMOL/L (ref 135–145)
TRIGL SERPL-MCNC: 60 MG/DL

## 2024-07-01 PROCEDURE — 99395 PREV VISIT EST AGE 18-39: CPT | Performed by: FAMILY MEDICINE

## 2024-07-01 PROCEDURE — 80053 COMPREHEN METABOLIC PANEL: CPT | Performed by: FAMILY MEDICINE

## 2024-07-01 PROCEDURE — 83036 HEMOGLOBIN GLYCOSYLATED A1C: CPT | Performed by: FAMILY MEDICINE

## 2024-07-01 PROCEDURE — 80061 LIPID PANEL: CPT | Performed by: FAMILY MEDICINE

## 2024-07-01 PROCEDURE — 99214 OFFICE O/P EST MOD 30 MIN: CPT | Mod: 25 | Performed by: FAMILY MEDICINE

## 2024-07-01 PROCEDURE — 36415 COLL VENOUS BLD VENIPUNCTURE: CPT | Performed by: FAMILY MEDICINE

## 2024-07-01 RX ORDER — CETIRIZINE HYDROCHLORIDE 10 MG/1
10 TABLET ORAL DAILY
Qty: 90 TABLET | Refills: 3 | Status: SHIPPED | OUTPATIENT
Start: 2024-07-01

## 2024-07-01 RX ORDER — ESCITALOPRAM OXALATE 10 MG/1
10 TABLET ORAL DAILY
Qty: 90 TABLET | Refills: 1 | Status: SHIPPED | OUTPATIENT
Start: 2024-07-01

## 2024-07-01 RX ORDER — PHENTERMINE HYDROCHLORIDE 30 MG/1
30 CAPSULE ORAL EVERY MORNING
Qty: 90 CAPSULE | Refills: 1 | Status: SHIPPED | OUTPATIENT
Start: 2024-07-01

## 2024-07-01 RX ORDER — TOPIRAMATE 100 MG/1
100 TABLET, FILM COATED ORAL DAILY
Qty: 90 TABLET | Refills: 3 | Status: SHIPPED | OUTPATIENT
Start: 2024-07-01

## 2024-07-01 SDOH — HEALTH STABILITY: PHYSICAL HEALTH: ON AVERAGE, HOW MANY MINUTES DO YOU ENGAGE IN EXERCISE AT THIS LEVEL?: 10 MIN

## 2024-07-01 SDOH — HEALTH STABILITY: PHYSICAL HEALTH: ON AVERAGE, HOW MANY DAYS PER WEEK DO YOU ENGAGE IN MODERATE TO STRENUOUS EXERCISE (LIKE A BRISK WALK)?: 1 DAY

## 2024-07-01 ASSESSMENT — ASTHMA QUESTIONNAIRES
QUESTION_3 LAST FOUR WEEKS HOW OFTEN DID YOUR ASTHMA SYMPTOMS (WHEEZING, COUGHING, SHORTNESS OF BREATH, CHEST TIGHTNESS OR PAIN) WAKE YOU UP AT NIGHT OR EARLIER THAN USUAL IN THE MORNING: ONCE OR TWICE
QUESTION_5 LAST FOUR WEEKS HOW WOULD YOU RATE YOUR ASTHMA CONTROL: SOMEWHAT CONTROLLED
QUESTION_2 LAST FOUR WEEKS HOW OFTEN HAVE YOU HAD SHORTNESS OF BREATH: ONCE OR TWICE A WEEK
QUESTION_1 LAST FOUR WEEKS HOW MUCH OF THE TIME DID YOUR ASTHMA KEEP YOU FROM GETTING AS MUCH DONE AT WORK, SCHOOL OR AT HOME: A LITTLE OF THE TIME
QUESTION_4 LAST FOUR WEEKS HOW OFTEN HAVE YOU USED YOUR RESCUE INHALER OR NEBULIZER MEDICATION (SUCH AS ALBUTEROL): ONCE A WEEK OR LESS
ACT_TOTALSCORE: 19
ACT_TOTALSCORE: 19

## 2024-07-01 ASSESSMENT — PATIENT HEALTH QUESTIONNAIRE - PHQ9
10. IF YOU CHECKED OFF ANY PROBLEMS, HOW DIFFICULT HAVE THESE PROBLEMS MADE IT FOR YOU TO DO YOUR WORK, TAKE CARE OF THINGS AT HOME, OR GET ALONG WITH OTHER PEOPLE: VERY DIFFICULT
SUM OF ALL RESPONSES TO PHQ QUESTIONS 1-9: 11
SUM OF ALL RESPONSES TO PHQ QUESTIONS 1-9: 11

## 2024-07-01 ASSESSMENT — SOCIAL DETERMINANTS OF HEALTH (SDOH): HOW OFTEN DO YOU GET TOGETHER WITH FRIENDS OR RELATIVES?: NEVER

## 2024-07-01 NOTE — PATIENT INSTRUCTIONS
"Patient Education   Preventive Care Advice   This is general advice we often give to help people stay healthy. Your care team may have specific advice just for you. Please talk to your care team about your own preventive care needs.  Lifestyle  Exercise at least 150 minutes each week (30 minutes a day, 5 days a week).  Do muscle strengthening activities 2 days a week. These help control your weight and prevent disease.  No smoking.  Wear sunscreen to prevent skin cancer.  Have your home tested for radon every 2 to 5 years. Radon is a colorless, odorless gas that can harm your lungs. To learn more, go to www.health.[facility].[place].us and search for \"Radon in Homes.\"  Keep guns unloaded and locked up in a safe place like a safe or gun vault, or, use a gun lock and hide the keys. Always lock away bullets separately. To learn more, visit [employer].[place].gov and search for \"safe gun storage.\"  Nutrition  Eat 5 or more servings of fruits and vegetables each day.  Try wheat bread, brown rice and whole grain pasta (instead of white bread, rice, and pasta).  Get enough calcium and vitamin D. Check the label on foods and aim for 100% of the RDA (recommended daily allowance).  Regular exams  Have a dental exam and cleaning every 6 months.  See your health care team every year to talk about:  Any changes in your health.  Any medicines your care team has prescribed.  Preventive care, family planning, and ways to prevent chronic diseases.  Shots (vaccines)   HPV shots (up to age 26), if you've never had them before.  Hepatitis B shots (up to age 59), if you've never had them before.  COVID-19 shot: Get this shot when it's due.  Flu shot: Get a flu shot every year.  Tetanus shot: Get a tetanus shot every 10 years.  Pneumococcal, hepatitis A, and RSV shots: Ask your care team if you need these based on your risk.  Shingles shot (for age 50 and up).  General health tests  Diabetes screening:  Starting at age 35, Get screened for diabetes at least " every 3 years.  If you are younger than age 35, ask your care team if you should be screened for diabetes.  Cholesterol test: At age 39, start having a cholesterol test every 5 years, or more often if advised.  Bone density scan (DEXA): At age 50, ask your care team if you should have this scan for osteoporosis (brittle bones).  Hepatitis C: Get tested at least once in your life.  Abdominal aortic aneurysm screening: Talk to your doctor about having this screening if you:  Have ever smoked; and  Are biologically male; and  Are between the ages of 65 and 75.  STIs (sexually transmitted infections)  Before age 24: Ask your care team if you should be screened for STIs.  After age 24: Get screened for STIs if you're at risk. You are at risk for STIs (including HIV) if:  You are sexually active with more than one person.  You don't use condoms every time.  You or a partner was diagnosed with a sexually transmitted infection.  If you are at risk for HIV, ask about PrEP medicine to prevent HIV.  Get tested for HIV at least once in your life, whether you are at risk for HIV or not.  Cancer screening tests  Cervical cancer screening: If you have a cervix, begin getting regular cervical cancer screening tests at age 21. Most people who have regular screenings with normal results can stop after age 65. Talk about this with your provider.  Breast cancer scan (mammogram): If you've ever had breasts, begin having regular mammograms starting at age 40. This is a scan to check for breast cancer.  Colon cancer screening: It is important to start screening for colon cancer at age 45.  Have a colonoscopy test every 10 years (or more often if you're at risk) Or, ask your provider about stool tests like a FIT test every year or Cologuard test every 3 years.  To learn more about your testing options, visit: www.ethority/604027.pdf.  For help making a decision, visit: camila/dk18143.  Prostate cancer screening test: If you have a  prostate and are age 55 to 69, ask your provider if you would benefit from a yearly prostate cancer screening test.  Lung cancer screening: If you are a current or former smoker age 50 to 80, ask your care team if ongoing lung cancer screenings are right for you.  For informational purposes only. Not to replace the advice of your health care provider. Copyright   2023 [facility]. All rights reserved. Clinically reviewed by the [facility] Transitions Program. [employer] [id number] - REV 04/24.
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
## 2024-07-01 NOTE — LETTER
My Asthma Action Plan    Name: Karuna Ann   YOB: 1985  Date: 7/1/2024   My doctor: Annemarie May MD   My clinic: [facility]        My Control Medicine: Fluticasone propionate + salmeterol (Advair Diskus or Wixela Inhub) -  250/50 mcg twice daily  My Rescue Medicine: Albuterol (Proair/Ventolin/Proventil HFA) 2-4 puffs EVERY 4 HOURS as needed. Use a spacer if recommended by your provider.  My Oral Steroid Medicine: prednisone 20 mg twice daily for 5 day My Asthma Severity:   Moderate Persistent  Know your asthma triggers: upper respiratory infections, pollens, exercise or sports, emotions, and cold air               GREEN ZONE   Good Control  I feel good  No cough or wheeze  Can work, sleep and play without asthma symptoms       Take your asthma control medicine every day.     If exercise triggers your asthma, take your rescue medication  15 minutes before exercise or sports, and  During exercise if you have asthma symptoms  Spacer to use with inhaler: If you have a spacer, make sure to use it with your inhaler             YELLOW ZONE Getting Worse  I have ANY of these:  I do not feel good  Cough or wheeze  Chest feels tight  Wake up at night   Keep taking your Green Zone medications  Start taking your rescue medicine:  every 20 minutes for up to 1 hour. Then every 4 hours for 24-48 hours.  If you stay in the Yellow Zone for more than 12-24 hours, contact your doctor.  If you do not return to the Green Zone in 12-24 hours or you get worse, start taking your oral steroid medicine if prescribed by your provider.           RED ZONE Medical Alert - Get Help  I have ANY of these:  I feel awful  Medicine is not helping  Breathing getting harder  Trouble walking or talking  Nose opens wide to breathe       Take your rescue medicine NOW  If your provider has prescribed an oral steroid medicine, start taking it NOW  Call your doctor NOW  If you are still in the Red Zone after  20 minutes and you have not reached your doctor:  Take your rescue medicine again and  Call 911 or go to the emergency room right away    See your regular doctor within 2 weeks of an Emergency Room or Urgent Care visit for follow-up treatment.          Annual Reminders:  Meet with Asthma Educator,  Flu Shot in the Fall, consider Pneumonia Vaccination for patients with asthma (aged 19 and older).    [id number] [place], [place] - [address] [facility] [id number] [place], [place] - [address] AT [facility]    Electronically signed by Annemarie May MD   Date: 07/01/24                      Asthma Triggers  How To Control Things That Make Your Asthma Worse    Triggers are things that make your asthma worse.  Look at the list below to help you find your triggers and what you can do about them.  You can help prevent asthma flare-ups by staying away from your triggers.      Trigger                                                          What you can do   Cigarette Smoke  Tobacco smoke can make asthma worse. Do not allow smoking in your home, car or around you.  Be sure no one smokes at a child s day care or school.  If you smoke, ask your health care provider for ways to help you quit.  Ask family members to quit too.  Ask your health care provider for a referral to Quit Plan to help you quit smoking, or call 0-914-664-PLAN.     Colds, Flu, Bronchitis  These are common triggers of asthma. Wash your hands often.  Don t touch your eyes, nose or mouth.  Get a flu shot every year.     Dust Mites  These are tiny bugs that live in cloth or carpet. They are too small to see. Wash sheets and blankets in hot water every week.   Encase pillows and mattress in dust mite proof covers.  Avoid having carpet if you can. If you have carpet, vacuum weekly.   Use a dust mask and HEPA vacuum.   Pollen and Outdoor Mold  Some people are  allergic to trees, grass, or weed pollen, or molds. Try to keep your windows closed.  Limit time out doors when pollen count is high.   Ask you health care provider about taking medicine during allergy season.     Animal Dander  Some people are allergic to skin flakes, urine or saliva from pets with fur or feathers. Keep pets with fur or feathers out of your home.    If you can t keep the pet outdoors, then keep the pet out of your bedroom.  Keep the bedroom door closed.  Keep pets off cloth furniture and away from stuffed toys.     Mice, Rats, and Cockroaches   Some people are allergic to the waste from these pests.   Cover food and garbage.  Clean up spills and food crumbs.  Store grease in the refrigerator.   Keep food out of the bedroom.   Indoor Mold  This can be a trigger if your home has high moisture. Fix leaking faucets, pipes, or other sources of water.   Clean moldy surfaces.  Dehumidify basement if it is damp and smelly.   Smoke, Strong Odors, and Sprays  These can reduce air quality. Stay away from strong odors and sprays, such as perfume, powder, hair spray, paints, smoke incense, paint, cleaning products, candles and new carpet.   Exercise or Sports  Some people with asthma have this trigger. Be active!  Ask your doctor about taking medicine before sports or exercise to prevent symptoms.    Warm up for 5-10 minutes before and after sports or exercise.     Other Triggers of Asthma  Cold air:  Cover your nose and mouth with a scarf.  Sometimes laughing or crying can be a trigger.  Some medicines and food can trigger asthma.

## 2024-07-05 ENCOUNTER — MYC MEDICAL ADVICE (OUTPATIENT)
Dept: FAMILY MEDICINE | Facility: CLINIC | Age: 39
End: 2024-07-05
Payer: COMMERCIAL

## 2024-08-22 ENCOUNTER — HOSPITAL ENCOUNTER (EMERGENCY)
Facility: CLINIC | Age: 39
Discharge: HOME | End: 2024-08-22
Attending: EMERGENCY MEDICINE | Admitting: EMERGENCY MEDICINE
Payer: COMMERCIAL

## 2024-08-22 ENCOUNTER — APPOINTMENT (OUTPATIENT)
Dept: ULTRASOUND IMAGING | Facility: CLINIC | Age: 39
End: 2024-08-22
Attending: EMERGENCY MEDICINE
Payer: COMMERCIAL

## 2024-08-22 VITALS
BODY MASS INDEX: 43.19 KG/M2 | TEMPERATURE: 98.6 F | WEIGHT: 220 LBS | OXYGEN SATURATION: 98 % | RESPIRATION RATE: 22 BRPM | HEART RATE: 99 BPM | SYSTOLIC BLOOD PRESSURE: 115 MMHG | DIASTOLIC BLOOD PRESSURE: 80 MMHG | HEIGHT: 60 IN

## 2024-08-22 DIAGNOSIS — N93.9 VAGINAL BLEEDING: ICD-10-CM

## 2024-08-22 LAB
ALBUMIN SERPL BCG-MCNC: 4 G/DL (ref 3.5–5.2)
ALP SERPL-CCNC: 69 U/L (ref 40–150)
ALT SERPL W P-5'-P-CCNC: 21 U/L (ref 0–50)
ANION GAP SERPL CALCULATED.3IONS-SCNC: 14 MMOL/L (ref 7–15)
AST SERPL W P-5'-P-CCNC: 19 U/L (ref 0–45)
BASOPHILS # BLD AUTO: 0 10E3/UL (ref 0–0.2)
BASOPHILS NFR BLD AUTO: 0 %
BILIRUB SERPL-MCNC: 0.2 MG/DL
BUN SERPL-MCNC: 12.4 MG/DL (ref 6–20)
CALCIUM SERPL-MCNC: 8.6 MG/DL (ref 8.8–10.4)
CHLORIDE SERPL-SCNC: 104 MMOL/L (ref 98–107)
CREAT SERPL-MCNC: 0.72 MG/DL (ref 0.51–0.95)
EGFRCR SERPLBLD CKD-EPI 2021: >90 ML/MIN/1.73M2
EOSINOPHIL # BLD AUTO: 0.1 10E3/UL (ref 0–0.7)
EOSINOPHIL NFR BLD AUTO: 1 %
ERYTHROCYTE [DISTWIDTH] IN BLOOD BY AUTOMATED COUNT: 13 % (ref 10–15)
GLUCOSE SERPL-MCNC: 131 MG/DL (ref 70–99)
HCG INTACT+B SERPL-ACNC: 4875 MIU/ML
HCO3 SERPL-SCNC: 23 MMOL/L (ref 22–29)
HCT VFR BLD AUTO: 29.5 % (ref 35–47)
HGB BLD-MCNC: 10.1 G/DL (ref 11.7–15.7)
IMM GRANULOCYTES # BLD: 0 10E3/UL
IMM GRANULOCYTES NFR BLD: 1 %
LYMPHOCYTES # BLD AUTO: 2 10E3/UL (ref 0.8–5.3)
LYMPHOCYTES NFR BLD AUTO: 25 %
MCH RBC QN AUTO: 28.1 PG (ref 26.5–33)
MCHC RBC AUTO-ENTMCNC: 34.2 G/DL (ref 31.5–36.5)
MCV RBC AUTO: 82 FL (ref 78–100)
MONOCYTES # BLD AUTO: 0.4 10E3/UL (ref 0–1.3)
MONOCYTES NFR BLD AUTO: 6 %
NEUTROPHILS # BLD AUTO: 5.2 10E3/UL (ref 1.6–8.3)
NEUTROPHILS NFR BLD AUTO: 67 %
NRBC # BLD AUTO: 0 10E3/UL
NRBC BLD AUTO-RTO: 0 /100
PLATELET # BLD AUTO: 279 10E3/UL (ref 150–450)
POTASSIUM SERPL-SCNC: 3.8 MMOL/L (ref 3.4–5.3)
PROT SERPL-MCNC: 6.9 G/DL (ref 6.4–8.3)
RBC # BLD AUTO: 3.59 10E6/UL (ref 3.8–5.2)
SODIUM SERPL-SCNC: 141 MMOL/L (ref 135–145)
WBC # BLD AUTO: 7.8 10E3/UL (ref 4–11)

## 2024-08-22 PROCEDURE — 76801 OB US < 14 WKS SINGLE FETUS: CPT

## 2024-08-22 PROCEDURE — 99284 EMERGENCY DEPT VISIT MOD MDM: CPT | Mod: 25

## 2024-08-22 PROCEDURE — 84702 CHORIONIC GONADOTROPIN TEST: CPT | Performed by: EMERGENCY MEDICINE

## 2024-08-22 PROCEDURE — 80053 COMPREHEN METABOLIC PANEL: CPT | Performed by: EMERGENCY MEDICINE

## 2024-08-22 PROCEDURE — 36415 COLL VENOUS BLD VENIPUNCTURE: CPT | Performed by: EMERGENCY MEDICINE

## 2024-08-22 PROCEDURE — 85025 COMPLETE CBC W/AUTO DIFF WBC: CPT | Performed by: EMERGENCY MEDICINE

## 2024-08-22 ASSESSMENT — ACTIVITIES OF DAILY LIVING (ADL)
ADLS_ACUITY_SCORE: 35
ADLS_ACUITY_SCORE: 35
ADLS_ACUITY_SCORE: 33

## 2024-08-22 ASSESSMENT — COLUMBIA-SUICIDE SEVERITY RATING SCALE - C-SSRS
1. IN THE PAST MONTH, HAVE YOU WISHED YOU WERE DEAD OR WISHED YOU COULD GO TO SLEEP AND NOT WAKE UP?: NO
2. HAVE YOU ACTUALLY HAD ANY THOUGHTS OF KILLING YOURSELF IN THE PAST MONTH?: NO
6. HAVE YOU EVER DONE ANYTHING, STARTED TO DO ANYTHING, OR PREPARED TO DO ANYTHING TO END YOUR LIFE?: NO

## 2024-08-22 NOTE — ED NOTES
AIDET performed, white board updated for rounding. Patient updated on plan of care. Patient's pain assessed. Call light within reach, bed in low position, side rails up. Visitor at bedside none.
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
## 2024-08-22 NOTE — ED TRIAGE NOTES
PT is coming in tonight with a large amount of vaginal bleeding. Pt sates that in the last hour she just sat on the toile and blood and clot pured out. Prior to that PT filled two large pads in 20 min. Pt had an  in July but it didn't take and less then a week ago she followed up with the MD. He gave her medication, pt had bleeding and passed clots and then seemed fine after that until today. Pt states that she was about 7 weeks along in July.      Triage Assessment (Adult)       Row Name 24 0144          Triage Assessment    Airway WDL WDL        Respiratory WDL    Respiratory WDL WDL        Skin Circulation/Temperature WDL    Skin Circulation/Temperature WDL WDL        Cardiac WDL    Cardiac WDL WDL        Peripheral/Neurovascular WDL    Peripheral Neurovascular WDL WDL        Cognitive/Neuro/Behavioral WDL    Cognitive/Neuro/Behavioral WDL WDL

## 2024-08-23 ENCOUNTER — PATIENT OUTREACH (OUTPATIENT)
Dept: FAMILY MEDICINE | Facility: CLINIC | Age: 39
End: 2024-08-23
Payer: COMMERCIAL

## 2024-08-23 NOTE — TELEPHONE ENCOUNTER
RN CORNELIO for patient to call clinic back for follow up from ED.     Please transfer to a nurse to complete the Post Discharge Assessment.    ANASTASIYA Puentes  [facility]  [phone]    [facility]   Monday  - Thursday 7 AM - 6 PM    Friday  7 AM - 5 PM     -Please call your clinic for assistance from a nurse after hours.

## 2024-09-03 DIAGNOSIS — R05.9 COUGH: ICD-10-CM

## 2024-09-04 RX ORDER — FLUTICASONE PROPIONATE AND SALMETEROL 50; 250 UG/1; UG/1
POWDER RESPIRATORY (INHALATION)
Qty: 60 EACH | Refills: 1 | Status: SHIPPED | OUTPATIENT
Start: 2024-09-04

## 2024-11-18 ENCOUNTER — OFFICE VISIT (OUTPATIENT)
Dept: ORTHOPEDICS | Facility: CLINIC | Age: 39
End: 2024-11-18

## 2024-11-18 ENCOUNTER — ANCILLARY PROCEDURE (OUTPATIENT)
Dept: GENERAL RADIOLOGY | Facility: CLINIC | Age: 39
End: 2024-11-18
Attending: FAMILY MEDICINE
Payer: COMMERCIAL

## 2024-11-18 VITALS
SYSTOLIC BLOOD PRESSURE: 140 MMHG | DIASTOLIC BLOOD PRESSURE: 85 MMHG | BODY MASS INDEX: 43.19 KG/M2 | HEIGHT: 60 IN | WEIGHT: 220 LBS

## 2024-11-18 DIAGNOSIS — S60.221A CONTUSION OF DORSUM OF RIGHT HAND: ICD-10-CM

## 2024-11-18 DIAGNOSIS — M79.641 RIGHT HAND PAIN: ICD-10-CM

## 2024-11-18 DIAGNOSIS — M79.641 RIGHT HAND PAIN: Primary | ICD-10-CM

## 2024-11-18 PROCEDURE — G2211 COMPLEX E/M VISIT ADD ON: HCPCS | Performed by: FAMILY MEDICINE

## 2024-11-18 PROCEDURE — 73130 X-RAY EXAM OF HAND: CPT | Mod: RT | Performed by: FAMILY MEDICINE

## 2024-11-18 PROCEDURE — 99204 OFFICE O/P NEW MOD 45 MIN: CPT | Performed by: FAMILY MEDICINE

## 2024-11-18 RX ORDER — DICLOFENAC SODIUM 75 MG/1
75 TABLET, DELAYED RELEASE ORAL 2 TIMES DAILY PRN
Qty: 60 TABLET | Refills: 0 | Status: SHIPPED | OUTPATIENT
Start: 2024-11-18
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
## 2024-11-18 NOTE — Clinical Note
2024    Karuna Ann   1985        To Whom it May Concern;    Please excuse Karuna Ann from work/school for a healthcare visit on 2024.    Sincerely,        Albert Yeo, MD That's fine.  Scripts loaded. Send to correct pharmacy

## 2024-11-22 ENCOUNTER — MYC MEDICAL ADVICE (OUTPATIENT)
Dept: ORTHOPEDICS | Facility: CLINIC | Age: 39
End: 2024-11-22
Payer: COMMERCIAL
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
## 2024-11-23 ENCOUNTER — OFFICE VISIT (OUTPATIENT)
Dept: ORTHOPEDICS | Facility: CLINIC | Age: 39
End: 2024-11-23
Payer: OTHER MISCELLANEOUS

## 2024-11-23 VITALS — WEIGHT: 220 LBS | HEIGHT: 60 IN | BODY MASS INDEX: 43.19 KG/M2

## 2024-11-23 DIAGNOSIS — M79.89 SWELLING OF RIGHT HAND: ICD-10-CM

## 2024-11-23 DIAGNOSIS — S60.221A CONTUSION OF DORSUM OF RIGHT HAND: Primary | ICD-10-CM

## 2024-11-23 PROCEDURE — 99203 OFFICE O/P NEW LOW 30 MIN: CPT | Performed by: PHYSICIAN ASSISTANT

## 2024-11-23 ASSESSMENT — PAIN SCALES - GENERAL: PAINLEVEL_OUTOF10: SEVERE PAIN (6)
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
## 2024-11-23 NOTE — LETTER
November 23, 2024      Karuna Ann  [address] [place] [address]        To Whom It May Concern:    Karuna Ann was seen in our clinic. She may return to work with the following: no weight bearing with the right upper extremity, brace on all time, and allow time off to attend hand therapy. These are in placed until follow up in 2 weeks.      Sincerely,      Michelle Shaffer
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
## 2024-11-23 NOTE — PATIENT INSTRUCTIONS
1. Contusion of dorsum of right hand    2. Swelling of right hand        Recommendations  - OTC Tylenol 1000 mg and a NSAID (Voltaren) every 8 hours for pain.   -Topical creams such as Voltaren gel, Asperecreme, Biofreeze, china gel, Tiger Balm or blue emu.   - Apply ice first 72 hours post injury, then alternate heat and ice  -Compression wraps or braces as needed  -Tumeric OTC, natural inflammation reducer  Vitamin C 500 mg daily     Physical/Occupational therapy referral placed    Follow up with Dr Yeo in about 1-2 week for recheck    -Call direct clinic number [[phone]] at any time with questions or concerns.    -Orthopedic Walk in Monday through Thursday 8 am to 6 pm, Friday 8 am to 5 pm, Saturday 8 am to 12 pm at [address] [address] [place].

## 2024-11-25 NOTE — TELEPHONE ENCOUNTER
Upon chart review, patient saw Michelle Shaffer PA-C on 11/23/24 and he provided updated work restrictions for patient at that time.     No further action needed.    Radha Smith, ATC
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
## 2024-11-26 ENCOUNTER — THERAPY VISIT (OUTPATIENT)
Dept: OCCUPATIONAL THERAPY | Facility: CLINIC | Age: 39
End: 2024-11-26
Attending: PHYSICIAN ASSISTANT
Payer: OTHER MISCELLANEOUS

## 2024-11-26 DIAGNOSIS — M79.89 SWELLING OF RIGHT HAND: ICD-10-CM

## 2024-11-26 DIAGNOSIS — S60.221A CONTUSION OF DORSUM OF RIGHT HAND: Primary | ICD-10-CM

## 2024-11-26 PROCEDURE — 97165 OT EVAL LOW COMPLEX 30 MIN: CPT | Mod: GO | Performed by: OCCUPATIONAL THERAPIST

## 2024-11-26 PROCEDURE — 97010 HOT OR COLD PACKS THERAPY: CPT | Mod: GO | Performed by: OCCUPATIONAL THERAPIST

## 2024-11-26 PROCEDURE — 97140 MANUAL THERAPY 1/> REGIONS: CPT | Mod: GO | Performed by: OCCUPATIONAL THERAPIST

## 2024-11-26 PROCEDURE — 97110 THERAPEUTIC EXERCISES: CPT | Mod: GO | Performed by: OCCUPATIONAL THERAPIST

## 2024-11-29 ENCOUNTER — ANCILLARY PROCEDURE (OUTPATIENT)
Dept: GENERAL RADIOLOGY | Facility: CLINIC | Age: 39
End: 2024-11-29
Attending: PHYSICIAN ASSISTANT
Payer: COMMERCIAL

## 2024-11-29 DIAGNOSIS — M25.531 RIGHT WRIST PAIN: ICD-10-CM

## 2024-11-29 PROCEDURE — 73110 X-RAY EXAM OF WRIST: CPT | Mod: TC | Performed by: RADIOLOGY
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
## 2024-12-04 ENCOUNTER — VIRTUAL VISIT (OUTPATIENT)
Dept: PSYCHOLOGY | Facility: CLINIC | Age: 39
End: 2024-12-04
Payer: COMMERCIAL

## 2024-12-04 DIAGNOSIS — F33.1 MODERATE EPISODE OF RECURRENT MAJOR DEPRESSIVE DISORDER (H): ICD-10-CM

## 2024-12-04 DIAGNOSIS — F42.2 MIXED OBSESSIONAL THOUGHTS AND ACTS: ICD-10-CM

## 2024-12-04 PROCEDURE — 90791 PSYCH DIAGNOSTIC EVALUATION: CPT | Mod: 95 | Performed by: COUNSELOR

## 2024-12-04 ASSESSMENT — COLUMBIA-SUICIDE SEVERITY RATING SCALE - C-SSRS
TOTAL  NUMBER OF INTERRUPTED ATTEMPTS SINCE LAST CONTACT: NO
TOTAL  NUMBER OF ABORTED OR SELF INTERRUPTED ATTEMPTS SINCE LAST CONTACT: NO
1. SINCE LAST CONTACT, HAVE YOU WISHED YOU WERE DEAD OR WISHED YOU COULD GO TO SLEEP AND NOT WAKE UP?: NO
2. HAVE YOU ACTUALLY HAD ANY THOUGHTS OF KILLING YOURSELF?: NO
6. HAVE YOU EVER DONE ANYTHING, STARTED TO DO ANYTHING, OR PREPARED TO DO ANYTHING TO END YOUR LIFE?: NO
ATTEMPT SINCE LAST CONTACT: NO

## 2024-12-04 ASSESSMENT — PATIENT HEALTH QUESTIONNAIRE - PHQ9
SUM OF ALL RESPONSES TO PHQ QUESTIONS 1-9: 11
SUM OF ALL RESPONSES TO PHQ QUESTIONS 1-9: 11
10. IF YOU CHECKED OFF ANY PROBLEMS, HOW DIFFICULT HAVE THESE PROBLEMS MADE IT FOR YOU TO DO YOUR WORK, TAKE CARE OF THINGS AT HOME, OR GET ALONG WITH OTHER PEOPLE: VERY DIFFICULT
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
## 2024-12-11 ENCOUNTER — VIRTUAL VISIT (OUTPATIENT)
Dept: FAMILY MEDICINE | Facility: CLINIC | Age: 39
End: 2024-12-11
Payer: OTHER MISCELLANEOUS

## 2024-12-11 ENCOUNTER — VIRTUAL VISIT (OUTPATIENT)
Dept: PSYCHOLOGY | Facility: CLINIC | Age: 39
End: 2024-12-11
Payer: COMMERCIAL

## 2024-12-11 DIAGNOSIS — F33.1 MODERATE EPISODE OF RECURRENT MAJOR DEPRESSIVE DISORDER (H): ICD-10-CM

## 2024-12-11 DIAGNOSIS — Z02.6 ENCOUNTER RELATED TO WORKER'S COMPENSATION CLAIM: Primary | ICD-10-CM

## 2024-12-11 DIAGNOSIS — F42.2 MIXED OBSESSIONAL THOUGHTS AND ACTS: ICD-10-CM

## 2024-12-11 DIAGNOSIS — S69.91XA INJURY OF HAND, RIGHT, INITIAL ENCOUNTER: ICD-10-CM

## 2024-12-11 DIAGNOSIS — F43.10 PTSD (POST-TRAUMATIC STRESS DISORDER): ICD-10-CM

## 2024-12-11 DIAGNOSIS — F33.1 MODERATE EPISODE OF RECURRENT MAJOR DEPRESSIVE DISORDER (H): Primary | ICD-10-CM
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
## 2024-12-11 PROCEDURE — 90834 PSYTX W PT 45 MINUTES: CPT | Mod: 95 | Performed by: COUNSELOR

## 2024-12-11 PROCEDURE — G2211 COMPLEX E/M VISIT ADD ON: HCPCS | Mod: 95 | Performed by: FAMILY MEDICINE

## 2024-12-11 PROCEDURE — 99214 OFFICE O/P EST MOD 30 MIN: CPT | Mod: 95 | Performed by: FAMILY MEDICINE

## 2024-12-11 RX ORDER — ESCITALOPRAM OXALATE 20 MG/1
20 TABLET ORAL DAILY
Qty: 90 TABLET | Refills: 1 | Status: SHIPPED | OUTPATIENT
Start: 2024-12-11

## 2024-12-11 ASSESSMENT — COLUMBIA-SUICIDE SEVERITY RATING SCALE - C-SSRS
6. HAVE YOU EVER DONE ANYTHING, STARTED TO DO ANYTHING, OR PREPARED TO DO ANYTHING TO END YOUR LIFE?: NO
TOTAL  NUMBER OF INTERRUPTED ATTEMPTS SINCE LAST CONTACT: NO
2. HAVE YOU ACTUALLY HAD ANY THOUGHTS OF KILLING YOURSELF?: NO
ATTEMPT SINCE LAST CONTACT: NO
1. SINCE LAST CONTACT, HAVE YOU WISHED YOU WERE DEAD OR WISHED YOU COULD GO TO SLEEP AND NOT WAKE UP?: NO

## 2024-12-11 ASSESSMENT — ASTHMA QUESTIONNAIRES
ACT_TOTALSCORE: 20
ACT_TOTALSCORE: 20
QUESTION_2 LAST FOUR WEEKS HOW OFTEN HAVE YOU HAD SHORTNESS OF BREATH: ONCE OR TWICE A WEEK
QUESTION_5 LAST FOUR WEEKS HOW WOULD YOU RATE YOUR ASTHMA CONTROL: SOMEWHAT CONTROLLED
QUESTION_4 LAST FOUR WEEKS HOW OFTEN HAVE YOU USED YOUR RESCUE INHALER OR NEBULIZER MEDICATION (SUCH AS ALBUTEROL): NOT AT ALL
QUESTION_3 LAST FOUR WEEKS HOW OFTEN DID YOUR ASTHMA SYMPTOMS (WHEEZING, COUGHING, SHORTNESS OF BREATH, CHEST TIGHTNESS OR PAIN) WAKE YOU UP AT NIGHT OR EARLIER THAN USUAL IN THE MORNING: ONCE OR TWICE
QUESTION_1 LAST FOUR WEEKS HOW MUCH OF THE TIME DID YOUR ASTHMA KEEP YOU FROM GETTING AS MUCH DONE AT WORK, SCHOOL OR AT HOME: A LITTLE OF THE TIME

## 2024-12-11 ASSESSMENT — ANXIETY QUESTIONNAIRES
3. WORRYING TOO MUCH ABOUT DIFFERENT THINGS: NEARLY EVERY DAY
7. FEELING AFRAID AS IF SOMETHING AWFUL MIGHT HAPPEN: SEVERAL DAYS
6. BECOMING EASILY ANNOYED OR IRRITABLE: NEARLY EVERY DAY
1. FEELING NERVOUS, ANXIOUS, OR ON EDGE: MORE THAN HALF THE DAYS
GAD7 TOTAL SCORE: 17
4. TROUBLE RELAXING: NEARLY EVERY DAY
GAD7 TOTAL SCORE: 17
8. IF YOU CHECKED OFF ANY PROBLEMS, HOW DIFFICULT HAVE THESE MADE IT FOR YOU TO DO YOUR WORK, TAKE CARE OF THINGS AT HOME, OR GET ALONG WITH OTHER PEOPLE?: EXTREMELY DIFFICULT
GAD7 TOTAL SCORE: 17
2. NOT BEING ABLE TO STOP OR CONTROL WORRYING: NEARLY EVERY DAY
IF YOU CHECKED OFF ANY PROBLEMS ON THIS QUESTIONNAIRE, HOW DIFFICULT HAVE THESE PROBLEMS MADE IT FOR YOU TO DO YOUR WORK, TAKE CARE OF THINGS AT HOME, OR GET ALONG WITH OTHER PEOPLE: EXTREMELY DIFFICULT
5. BEING SO RESTLESS THAT IT IS HARD TO SIT STILL: MORE THAN HALF THE DAYS
7. FEELING AFRAID AS IF SOMETHING AWFUL MIGHT HAPPEN: SEVERAL DAYS
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
## 2024-12-18 ENCOUNTER — VIRTUAL VISIT (OUTPATIENT)
Dept: PSYCHOLOGY | Facility: CLINIC | Age: 39
End: 2024-12-18
Payer: COMMERCIAL

## 2024-12-18 DIAGNOSIS — F33.1 MODERATE EPISODE OF RECURRENT MAJOR DEPRESSIVE DISORDER (H): Primary | ICD-10-CM

## 2024-12-18 PROCEDURE — 90834 PSYTX W PT 45 MINUTES: CPT | Mod: 95 | Performed by: COUNSELOR
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
## 2024-12-26 ENCOUNTER — VIRTUAL VISIT (OUTPATIENT)
Dept: PSYCHOLOGY | Facility: CLINIC | Age: 39
End: 2024-12-26
Payer: COMMERCIAL

## 2024-12-26 DIAGNOSIS — F33.1 MODERATE EPISODE OF RECURRENT MAJOR DEPRESSIVE DISORDER (H): Primary | ICD-10-CM

## 2024-12-31 ENCOUNTER — HOSPITAL ENCOUNTER (OUTPATIENT)
Dept: MRI IMAGING | Facility: CLINIC | Age: 39
Discharge: HOME | End: 2024-12-31
Attending: FAMILY MEDICINE
Payer: OTHER MISCELLANEOUS

## 2024-12-31 DIAGNOSIS — S60.221A CONTUSION OF DORSUM OF RIGHT HAND: ICD-10-CM

## 2024-12-31 DIAGNOSIS — M79.89 SWELLING OF RIGHT HAND: ICD-10-CM

## 2024-12-31 DIAGNOSIS — M79.641 RIGHT HAND PAIN: ICD-10-CM

## 2024-12-31 PROCEDURE — 73218 MRI UPPER EXTREMITY W/O DYE: CPT | Mod: RT

## 2025-01-02 ENCOUNTER — VIRTUAL VISIT (OUTPATIENT)
Dept: PSYCHOLOGY | Facility: CLINIC | Age: 40
End: 2025-01-02
Payer: COMMERCIAL

## 2025-01-02 DIAGNOSIS — F33.1 MODERATE EPISODE OF RECURRENT MAJOR DEPRESSIVE DISORDER (H): Primary | ICD-10-CM

## 2025-01-02 ASSESSMENT — COLUMBIA-SUICIDE SEVERITY RATING SCALE - C-SSRS
TOTAL  NUMBER OF ABORTED OR SELF INTERRUPTED ATTEMPTS SINCE LAST CONTACT: NO
TOTAL  NUMBER OF INTERRUPTED ATTEMPTS SINCE LAST CONTACT: NO
ATTEMPT SINCE LAST CONTACT: NO
SUICIDE, SINCE LAST CONTACT: NO
1. SINCE LAST CONTACT, HAVE YOU WISHED YOU WERE DEAD OR WISHED YOU COULD GO TO SLEEP AND NOT WAKE UP?: NO
6. HAVE YOU EVER DONE ANYTHING, STARTED TO DO ANYTHING, OR PREPARED TO DO ANYTHING TO END YOUR LIFE?: NO
2. HAVE YOU ACTUALLY HAD ANY THOUGHTS OF KILLING YOURSELF?: NO

## 2025-01-02 ASSESSMENT — ANXIETY QUESTIONNAIRES
6. BECOMING EASILY ANNOYED OR IRRITABLE: MORE THAN HALF THE DAYS
2. NOT BEING ABLE TO STOP OR CONTROL WORRYING: MORE THAN HALF THE DAYS
1. FEELING NERVOUS, ANXIOUS, OR ON EDGE: MORE THAN HALF THE DAYS
8. IF YOU CHECKED OFF ANY PROBLEMS, HOW DIFFICULT HAVE THESE MADE IT FOR YOU TO DO YOUR WORK, TAKE CARE OF THINGS AT HOME, OR GET ALONG WITH OTHER PEOPLE?: VERY DIFFICULT
GAD7 TOTAL SCORE: 14
3. WORRYING TOO MUCH ABOUT DIFFERENT THINGS: MORE THAN HALF THE DAYS
4. TROUBLE RELAXING: MORE THAN HALF THE DAYS
GAD7 TOTAL SCORE: 14
IF YOU CHECKED OFF ANY PROBLEMS ON THIS QUESTIONNAIRE, HOW DIFFICULT HAVE THESE PROBLEMS MADE IT FOR YOU TO DO YOUR WORK, TAKE CARE OF THINGS AT HOME, OR GET ALONG WITH OTHER PEOPLE: VERY DIFFICULT
7. FEELING AFRAID AS IF SOMETHING AWFUL MIGHT HAPPEN: MORE THAN HALF THE DAYS
5. BEING SO RESTLESS THAT IT IS HARD TO SIT STILL: MORE THAN HALF THE DAYS
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
## 2025-01-03 NOTE — TELEPHONE ENCOUNTER
Medication Question or Clarification  Who is calling: patient  What medication are you calling about (include dose and sig)?:     prenatal vit no.130-iron-folic (PRENATAL VITAMIN) 27 mg iron- 800 mcg Tab tablet  90 tablet  3  9/3/2020      Sig - Route: Take 1 tablet by mouth daily. - Oral     Class: OTC       Who prescribed the medication?: Dr. May  What is your question/concern?: Patient reports the pharmacy does not have this prescription. Please advise.  Requested Pharmacy: [facility] to leave a detailed message?: Yes         Pt BIB POV from  office for left FA pain and edema 2 weeks post op from ORIF to area s/p fall and closed reduction in office followed by ORIF s/t failure to set. Ambulatory, NAD. C/o pain and swelling to left FA w/ mild paraesthesias to fingers. Cap refill to left hand, color, sensation all wnl.

## 2025-01-06 ENCOUNTER — OFFICE VISIT (OUTPATIENT)
Dept: ORTHOPEDICS | Facility: CLINIC | Age: 40
End: 2025-01-06
Attending: PHYSICIAN ASSISTANT
Payer: OTHER MISCELLANEOUS

## 2025-01-06 VITALS — HEIGHT: 61 IN | WEIGHT: 225 LBS | BODY MASS INDEX: 42.48 KG/M2

## 2025-01-06 DIAGNOSIS — S60.221A CONTUSION OF DORSUM OF RIGHT HAND: Primary | ICD-10-CM

## 2025-01-06 DIAGNOSIS — M79.89 SWELLING OF RIGHT HAND: ICD-10-CM

## 2025-01-06 DIAGNOSIS — M25.531 RIGHT WRIST PAIN: ICD-10-CM

## 2025-01-06 PROCEDURE — 99214 OFFICE O/P EST MOD 30 MIN: CPT | Performed by: FAMILY MEDICINE

## 2025-01-06 PROCEDURE — G2211 COMPLEX E/M VISIT ADD ON: HCPCS | Performed by: FAMILY MEDICINE

## 2025-01-06 RX ORDER — MELOXICAM 15 MG/1
15 TABLET ORAL DAILY
Qty: 30 TABLET | Refills: 0 | Status: SHIPPED | OUTPATIENT
Start: 2025-01-06

## 2025-01-06 NOTE — PATIENT INSTRUCTIONS
1. Contusion of dorsum of right hand    2. Swelling of right hand    3. Right wrist pain      -Patient is following up for right hand and wrist pain due to contusions and weakness of the flexor and extensor muscles  -MRI review of the right hand shows nonspecific swelling between the third and fourth metacarpal heads.  No acute fracture or tearing.  -Since patient does not have any structural damage, she will progress in her hand therapy and home exercise program.  Much of her pain and soreness is a due to deconditioning and weakness of her muscles  -Patient does still report intermittent swelling and soreness of her muscles.  -Patient will start meloxicam 15 mg daily for the next 2 weeks and then on an as-needed basis.  Patient may take extreme Tylenol for breakthrough pain as needed  -Patient will continue to be held out of work  -Follow-up in 4 weeks for reevaluation and progression of activity  -Call direct clinic number [[phone]] at any time with questions or concerns.    Albert Yeo MD [facility]  [facility] [place]

## 2025-01-06 NOTE — PROGRESS NOTES
ASSESSMENT & PLAN  Patient Instructions     1. Contusion of dorsum of right hand    2. Swelling of right hand    3. Right wrist pain      -Patient is following up for right hand and wrist pain due to contusions and weakness of the flexor and extensor muscles  -MRI review of the right hand shows nonspecific swelling between the third and fourth metacarpal heads.  No acute fracture or tearing.  -Since patient does not have any structural damage, she will progress in her hand therapy and home exercise program.  Much of her pain and soreness is a due to deconditioning and weakness of her muscles  -Patient does still report intermittent swelling and soreness of her muscles.  -Patient will start meloxicam 15 mg daily for the next 2 weeks and then on an as-needed basis.  Patient may take extreme Tylenol for breakthrough pain as needed  -Patient will continue to be held out of work  -Follow-up in 4 weeks for reevaluation and progression of activity  -Call direct clinic number [[phone]] at any time with questions or concerns.    Albert Yeo MD [facility] Orthopedics and Sports Medicine  [facility]        -----    SUBJECTIVE:  Karuna Ann is a 39 year old female who is seen in follow-up for right hand and wrist pain.They were last seen 12/13/2024.     Since their last visit reports 0% - (About the same as last time). They indicate that their current pain level is 5/10. They have tried rest/activity avoidance, ice, heat, Tylenol, Ibuprofen, other medications: Prednisone (Medrol), and casting/splinting/bracing.      The patient is seen by themselves.    Patient's past medical, surgical, social, and family histories were reviewed today and no changes are noted.    REVIEW OF SYSTEMS:  Constitutional: NEGATIVE for fever, chills, change in weight  Skin: NEGATIVE for worrisome rashes, moles or lesions  GI/: NEGATIVE for bowel or bladder changes  Neuro: NEGATIVE for weakness, dizziness or  "paresthesias    OBJECTIVE:  Ht 1.549 m (5' 1\")   Wt 102.1 kg (225 lb)   BMI 42.51 kg/m     General: healthy, alert and in no distress  HEENT: no scleral icterus or conjunctival erythema  Skin: no suspicious lesions or rash. No jaundice.  CV: regular rhythm by palpation, no pedal edema  Resp: normal respiratory effort without conversational dyspnea   Psych: normal mood and affect  Gait: normal steady gait with appropriate coordination and balance  Neuro: normal light touch sensory exam of the extremities.    MSK:  RIGHT HAND  Inspection:    No swelling or obvious deformity or asymmetry  Palpation:   Carpals: normal   Metacarpals: 3rd metacarpal, 4th metacarpal, 5th metacarpal   Thumb: normal   Fingers: normal  Range of Motion:    Full active flexion and extension at MCP, PIP, and DIP joints; normal finger cascade without malrotation.  Wrist pronation, supination, and ulnar/radial deviation normal.  Strength:  Grossly intact  Special Tests:    Positive: none    Independent visualization of the below image:  Results for orders placed or performed during the hospital encounter of 12/31/24   MR Hand Right w/o Contrast    Narrative    EXAMINATION: MR HAND RIGHT W/O CONTRAST  12/31/2024 8:43 AM     CLINICAL HISTORY:  Persistent pain, swelling, weakness s/p trauma.   eval for tendinitis vs nondisplaced fx vs contusion; Contusion of  dorsum of right hand; Swelling of right hand; Right hand pain     COMPARISON: 11/29/2024 radiograph    TECHNIQUE: PD-weighted images with and without fat supression were  obtained in three orthogonal planes without the administration of  intravenous or intraarticular gadolinium contrast agent.     FINDINGS:     The marrow signal intensity of the right hand is normal. Specifically,  there is no evidence of fracture or malalignment of the wrist and  hand. No osteonecrosis.     The metatarsophalangeal joints reveal normal joint space. Scattered  subcortical cystic changes are noted this in the " metacarpal head of  the third digit. There is minimal edema in the head of the second  metacarpal with tiny subcortical cysts.    The extensor tendons are normal in appearance.     A minimal amount of fluid is seen in the tendon sheath of the fourth  extensor tendon.    The flexor tendons are normal in appearance. There is no evidence of  tendinopathy or tenosynovitis.    A marker is placed over the metacarpal head of the third and fourth  digit. Deep to the marker there is faint edema and minimal fatty  infiltration of the distal lumbricals of the interdigital space  between the third and fourth metacarpal heads of uncertain  significance (series 6, image 34 and series 9, image 33).    Minimal edema in the abductor pollicis.    The median nerve in the carpal tunnel proximally appears somewhat  hyperintense and is measuring 7 mm in long axis.     The ulnar nerve in Guyon's canal appears normal.      Impression    IMPRESSION:   1. Deep to the marker there is faint edema and minimal fatty  infiltration of the distal lumbricals of the interdigital space  between the third and fourth metacarpal heads of uncertain  significance.  Recommend clinical correlation and if clinically  indicated a nerve conduction study could be performed.   2. The median nerve in the carpal tunnel proximally appears mildly  hyperintense on T2 weighted imaging and is measuring 7 mm in long  axis. Recommend clinical correlation for carpal tunnel syndrome.  3. Normal appearance of the flexor and extensor tendons.    JUTTA ELLERMANN, MD         SYSTEM ID:  [id number]             Albert Yeo MD, [facility]

## 2025-01-06 NOTE — LETTER
January 6, 2025      Karuna Ann  [address] [address]        To Whom It May Concern:    Karuna Ann  was seen on 1/6/2025.  Please excuse her from work until 2/3/2025 due to injury.        Sincerely,        Albert Yeo, MD    Electronically signed

## 2025-01-06 NOTE — LETTER
1/6/2025      Karuna Ann  [address] [address]      Dear Colleague,    Thank you for referring your patient, Karuna Ann, to the [facility] [place]. Please see a copy of my visit note below.    ASSESSMENT & PLAN  Patient Instructions     1. Contusion of dorsum of right hand    2. Swelling of right hand    3. Right wrist pain      -Patient is following up for right hand and wrist pain due to contusions and weakness of the flexor and extensor muscles  -MRI review of the right hand shows nonspecific swelling between the third and fourth metacarpal heads.  No acute fracture or tearing.  -Since patient does not have any structural damage, she will progress in her hand therapy and home exercise program.  Much of her pain and soreness is a due to deconditioning and weakness of her muscles  -Patient does still report intermittent swelling and soreness of her muscles.  -Patient will start meloxicam 15 mg daily for the next 2 weeks and then on an as-needed basis.  Patient may take extreme Tylenol for breakthrough pain as needed  -Patient will continue to be held out of work  -Follow-up in 4 weeks for reevaluation and progression of activity  -Call direct clinic number [[phone]] at any time with questions or concerns.    Albert Yeo MD [facility] Orthopedics and Sports Medicine  [facility]        -----    SUBJECTIVE:  Karuna Ann is a 39 year old female who is seen in follow-up for right hand and wrist pain.They were last seen 12/13/2024.     Since their last visit reports 0% - (About the same as last time). They indicate that their current pain level is 5/10. They have tried rest/activity avoidance, ice, heat, Tylenol, Ibuprofen, other medications: Prednisone (Medrol), and casting/splinting/bracing.      The patient is seen by themselves.    Patient's past medical, surgical, social, and family histories were reviewed today and no changes are  "noted.    REVIEW OF SYSTEMS:  Constitutional: NEGATIVE for fever, chills, change in weight  Skin: NEGATIVE for worrisome rashes, moles or lesions  GI/: NEGATIVE for bowel or bladder changes  Neuro: NEGATIVE for weakness, dizziness or paresthesias    OBJECTIVE:  Ht 1.549 m (5' 1\")   Wt 102.1 kg (225 lb)   BMI 42.51 kg/m     General: healthy, alert and in no distress  HEENT: no scleral icterus or conjunctival erythema  Skin: no suspicious lesions or rash. No jaundice.  CV: regular rhythm by palpation, no pedal edema  Resp: normal respiratory effort without conversational dyspnea   Psych: normal mood and affect  Gait: normal steady gait with appropriate coordination and balance  Neuro: normal light touch sensory exam of the extremities.    MSK:  RIGHT HAND  Inspection:    No swelling or obvious deformity or asymmetry  Palpation:   Carpals: normal   Metacarpals: 3rd metacarpal, 4th metacarpal, 5th metacarpal   Thumb: normal   Fingers: normal  Range of Motion:    Full active flexion and extension at MCP, PIP, and DIP joints; normal finger cascade without malrotation.  Wrist pronation, supination, and ulnar/radial deviation normal.  Strength:  Grossly intact  Special Tests:    Positive: none    Independent visualization of the below image:  Results for orders placed or performed during the hospital encounter of 12/31/24   MR Hand Right w/o Contrast    Narrative    EXAMINATION: MR HAND RIGHT W/O CONTRAST  12/31/2024 8:43 AM     CLINICAL HISTORY:  Persistent pain, swelling, weakness s/p trauma.   eval for tendinitis vs nondisplaced fx vs contusion; Contusion of  dorsum of right hand; Swelling of right hand; Right hand pain     COMPARISON: 11/29/2024 radiograph    TECHNIQUE: PD-weighted images with and without fat supression were  obtained in three orthogonal planes without the administration of  intravenous or intraarticular gadolinium contrast agent.     FINDINGS:     The marrow signal intensity of the right hand is " normal. Specifically,  there is no evidence of fracture or malalignment of the wrist and  hand. No osteonecrosis.     The metatarsophalangeal joints reveal normal joint space. Scattered  subcortical cystic changes are noted this in the metacarpal head of  the third digit. There is minimal edema in the head of the second  metacarpal with tiny subcortical cysts.    The extensor tendons are normal in appearance.     A minimal amount of fluid is seen in the tendon sheath of the fourth  extensor tendon.    The flexor tendons are normal in appearance. There is no evidence of  tendinopathy or tenosynovitis.    A marker is placed over the metacarpal head of the third and fourth  digit. Deep to the marker there is faint edema and minimal fatty  infiltration of the distal lumbricals of the interdigital space  between the third and fourth metacarpal heads of uncertain  significance (series 6, image 34 and series 9, image 33).    Minimal edema in the abductor pollicis.    The median nerve in the carpal tunnel proximally appears somewhat  hyperintense and is measuring 7 mm in long axis.     The ulnar nerve in Guyon's canal appears normal.      Impression    IMPRESSION:   1. Deep to the marker there is faint edema and minimal fatty  infiltration of the distal lumbricals of the interdigital space  between the third and fourth metacarpal heads of uncertain  significance.  Recommend clinical correlation and if clinically  indicated a nerve conduction study could be performed.   2. The median nerve in the carpal tunnel proximally appears mildly  hyperintense on T2 weighted imaging and is measuring 7 mm in long  axis. Recommend clinical correlation for carpal tunnel syndrome.  3. Normal appearance of the flexor and extensor tendons.    JUTTA ELLERMANN, MD         SYSTEM ID:  [id number]             Albert Yeo MD, [facility]      Again, thank you for allowing me to participate in the care of your patient.         Sincerely,        Albert Yeo, MD    Electronically signed

## 2025-01-09 ENCOUNTER — VIRTUAL VISIT (OUTPATIENT)
Dept: PSYCHOLOGY | Facility: CLINIC | Age: 40
End: 2025-01-09
Payer: COMMERCIAL

## 2025-01-09 DIAGNOSIS — F33.1 MODERATE EPISODE OF RECURRENT MAJOR DEPRESSIVE DISORDER (H): Primary | ICD-10-CM

## 2025-01-09 NOTE — PROGRESS NOTES
[facility]                                     Progress Note    Patient Name: Karuna Ann  Date: 01/09/2025         Service Type: Individual      Session Start Time: 1700  Session End [id number]     Session Length:40     Session #: 4    Attendees: Client attended alone    Service Modality:  Video Visit:      Provider verified identity through the following two step process.  Patient provided:  Patient is known previously to provider    Telemedicine Visit: The patient's condition can be safely assessed and treated via synchronous audio and visual telemedicine encounter.      Reason for Telemedicine Visit: Patient has requested telehealth visit and Services only offered telehealth    Originating Site (Patient Location): Patient's home    Distant Site (Provider Location): [facility] Outpatient Setting: [facility]    Consent:  The patient/guardian has verbally consented to: the potential risks and benefits of telemedicine (video visit) versus in person care; bill my insurance or make self-payment for services provided; and responsibility for payment of non-covered services.     Patient would like the video invitation sent by:  My Chart    Mode of Communication:  Video Conference via [employer]well    Distant Location (Provider):  On-site    As the provider I attest to compliance with applicable laws and regulations related to telemedicine.    DATA  Interactive Complexity: No  Crisis: No        Progress Since Last Session (Related to Symptoms / Goals / Homework):   Symptoms: .Some mood changes, low mood    Homework: Safe Place, Container      Episode of Care Goals: Minimal progress - CONTEMPLATION (Considering change and yet undecided); Intervened by assessing the negative and positive thinking (ambivalence) about behavior change     Current / Ongoing Stressors and Concerns: She       Pt had an MRI on the 12/31 but the report hasn't been explained to her.   Her 14 year old son ended up having  friends over, 8 of his basketball friends. She enjoyed having more activity in the house.    Her MRI showed nerve damage so the Dr wants her to do 4 weeks of PT/OT and then reassess. She is still in her Master's program and will start two new classes. Pt went to an interview for a  position.          Treatment Objective(s) Addressed in This Session:   Use coping and soothing skills to calm body and mind       Intervention:       Motivational Interviewing    MI Intervention: Expressed Empathy/Understanding, Open-ended questions, and Reflections: simple and complex     Change Talk Expressed by the Patient: Ability to change Reasons to change    Provider Response to Change Talk: E - Evoked more info from patient about behavior change, A - Affirmed patient's thoughts, decisions, or attempts at behavior change, and S - Summarized patient's change talk statements    Assessments completed prior to visit:  The following assessments were completed by patient for this visit:  PHQ2:       3/23/2022     6:16 AM   PHQ-2 ( 1999 [employer])   Q1: Little interest or pleasure in doing things 1   Q2: Feeling down, depressed or hopeless 1   PHQ-2 Score 2   Q1: Little interest or pleasure in doing things Several days   Q2: Feeling down, depressed or hopeless Several days   PHQ-2 Score 2     GAD2:       12/1/2024    11:48 AM 1/2/2025    10:49 AM   SUSANA-2   Feeling nervous, anxious, or on edge 1 2   Not being able to stop or control worrying 1 2   SUSANA-2 Total Score 2  4        Patient-reported     [place] Suicide Severity Rating Scale (Short Version)      12/6/2021    11:59 AM 10/9/2022    11:50 PM 1/22/2023    10:18 PM 8/22/2024     1:47 AM 12/4/2024     7:00 AM 12/11/2024    10:00 AM 1/2/2025    11:00 AM   [place] Suicide Severity Rating (Short Version)   Over the past 2 weeks have you felt down, depressed, or hopeless? no no no       Over the past 2 weeks have you had thoughts of killing yourself? no no no       Have you ever  attempted to kill yourself? no no no       Q1 Wished to be Dead (Past Month)    0-->no      Q2 Suicidal Thoughts (Past Month)    0-->no      Q6 Suicide Behavior (Lifetime)    0-->no      Level of Risk per Screen    no risks indicated      1. Wish to be Dead (Since Last Contact)     N N N   2. Non-Specific Active Suicidal Thoughts (Since Last Contact)     N N N   Actual Attempt (Since Last Contact)     N N N   Has subject engaged in non-suicidal self-injurious behavior? (Since Last Contact)     N N N   Interrupted Attempts (Since Last Contact)     N N N   Aborted or Self-Interrupted Attempt (Since Last Contact)     N  N   Preparatory Acts or Behavior (Since Last Contact)     N N N   Suicide (Since Last Contact)       N   Calculated C-SSRS Risk Score (Since Last Contact)     No Risk Indicated No Risk Indicated No Risk Indicated     [place] Suicide Severity Rating Scale (Short Version)      12/6/2021    11:59 AM 10/9/2022    11:50 PM 1/22/2023    10:18 PM 8/22/2024     1:47 AM 12/4/2024     7:00 AM 12/11/2024    10:00 AM 1/2/2025    11:00 AM   [place] Suicide Severity Rating (Short Version)   Over the past 2 weeks have you felt down, depressed, or hopeless? no no no       Over the past 2 weeks have you had thoughts of killing yourself? no no no       Have you ever attempted to kill yourself? no no no       Q1 Wished to be Dead (Past Month)    0-->no      Q2 Suicidal Thoughts (Past Month)    0-->no      Q6 Suicide Behavior (Lifetime)    0-->no      Level of Risk per Screen    no risks indicated      1. Wish to be Dead (Since Last Contact)     N N N   2. Non-Specific Active Suicidal Thoughts (Since Last Contact)     N N N   Actual Attempt (Since Last Contact)     N N N   Has subject engaged in non-suicidal self-injurious behavior? (Since Last Contact)     N N N   Interrupted Attempts (Since Last Contact)     N N N   Aborted or Self-Interrupted Attempt (Since Last Contact)     N  N   Preparatory Acts or Behavior (Since  Last Contact)     N N N   Suicide (Since Last Contact)       N   Calculated C-SSRS Risk Score (Since Last Contact)     No Risk Indicated No Risk Indicated No Risk Indicated    [place] Suicide Severity Rating Scale (Short Version)      12/6/2021    11:59 AM 10/9/2022    11:50 PM 1/22/2023    10:18 PM 8/22/2024     1:47 AM 12/4/2024     7:00 AM 12/11/2024    10:00 AM 1/2/2025    11:00 AM   [place] Suicide Severity Rating (Short Version)   Over the past 2 weeks have you felt down, depressed, or hopeless? no no no       Over the past 2 weeks have you had thoughts of killing yourself? no no no       Have you ever attempted to kill yourself? no no no       Q1 Wished to be Dead (Past Month)    0-->no      Q2 Suicidal Thoughts (Past Month)    0-->no      Q6 Suicide Behavior (Lifetime)    0-->no      Level of Risk per Screen    no risks indicated      1. Wish to be Dead (Since Last Contact)     N N N   2. Non-Specific Active Suicidal Thoughts (Since Last Contact)     N N N   Actual Attempt (Since Last Contact)     N N N   Has subject engaged in non-suicidal self-injurious behavior? (Since Last Contact)     N N N   Interrupted Attempts (Since Last Contact)     N N N   Aborted or Self-Interrupted Attempt (Since Last Contact)     N  N   Preparatory Acts or Behavior (Since Last Contact)     N N N   Suicide (Since Last Contact)       N   Calculated C-SSRS Risk Score (Since Last Contact)     No Risk Indicated No Risk Indicated No Risk Indicated          ASSESSMENT: Current Emotional / Mental Status (status of significant symptoms):   Risk status (Self / Other harm or suicidal ideation)   Patient denies current fears or concerns for personal safety.   Patient denies current or recent suicidal ideation or behaviors.   Patient denies current or recent homicidal ideation or behaviors.   Patient denies current or recent self injurious behavior or ideation.   Patient denies other safety concerns.   Patient reports there has been no  change in risk factors since their last session.     Patient reports there has been no change in protective factors since their last session.     Recommended that patient call 911 or go to the local ED should there be a change in any of these risk factors     Appearance:   Appropriate    Eye Contact:   Good    Psychomotor Behavior: Normal    Attitude:   Cooperative    Orientation:   All   Speech    Rate / Production: Normal     Volume:  Normal    Mood:    Normal   Affect:    Appropriate    Thought Content:  Clear    Thought Form:  Coherent  Logical    Insight:    Good      Medication Review:   No changes to current psychiatric medication(s)     Medication Compliance:   Yes     Changes in Health Issues:   None reported     Chemical Use Review:   Substance Use: Chemical use reviewed, no active concerns identified      Tobacco Use: No current tobacco use.      Diagnosis:  No diagnosis found.      Collateral Reports Completed:   None    PLAN: (Patient Tasks / Therapist Tasks / Other)  Develop container and Safe Place        RAUL Zheng                                                         ______________________________________________________________________    Individual Treatment Plan    Patient's Name: Karuna Ann  YOB: 1985    Date of Creation: 12/11/2024  Date Treatment Plan Last Reviewed/Revised: 12/11/2024    DSM5 Diagnoses: 296.32 (F33.1) Major Depressive Disorder, Recurrent Episode, Moderate _ and With anxious distress  Psychosocial / Contextual Factors: Injured at work by student  PROMIS (reviewed every 90 days):     Referral / Collaboration:  Referral to another professional/service is not indicated at this time..    Anticipated number of session for this episode of care: 9-12 sessions  Anticipation frequency of session: Weekly  Anticipated Duration of each session: 38-52 minutes  Treatment plan will be reviewed in 90 days or when goals have been changed.       MeasurableTreatment  Goal(s) related to diagnosis / functional impairment(s)  Goal 1:         Treatment Goal(s)        Start Date Goal Dates  Reviewed Status    12/18/2024 Goal 1:  Client will effectively manage symptoms of PTSD     New     Objective #1A (Client Action)  Client will engage in 10 sessions of EMDR or FLASH in the next 90 days..    Intervention(s) (Therapist Action)  Therapist will complete trauma inventory, teach relaxation skills, and conduct therapy session using EMDR or FLASH     New     Objective #1B  Client will understand 4 facts about the development of traumatic stress disorders.    Intervention(s)  Therapist will explain information processing theory, fear structures     New     Objective #1C  (Client Action)  Client will complete at least 10 minutes of self-regulation practice (e.g.: yoga, meditation, relaxation breathing, etc.) per day over the next 12 weeks    Intervention(s)  Therapist will teach self-regulation skills and assign homework.     New           Patient has reviewed and agreed to the above plan.      RAUL Zheng  December 10, 2024

## 2025-01-16 ENCOUNTER — THERAPY VISIT (OUTPATIENT)
Dept: OCCUPATIONAL THERAPY | Facility: CLINIC | Age: 40
End: 2025-01-16
Payer: OTHER MISCELLANEOUS

## 2025-01-16 ENCOUNTER — VIRTUAL VISIT (OUTPATIENT)
Dept: PSYCHOLOGY | Facility: CLINIC | Age: 40
End: 2025-01-16
Payer: COMMERCIAL

## 2025-01-16 DIAGNOSIS — F33.1 MODERATE EPISODE OF RECURRENT MAJOR DEPRESSIVE DISORDER (H): Primary | ICD-10-CM

## 2025-01-16 DIAGNOSIS — M79.89 SWELLING OF RIGHT HAND: ICD-10-CM

## 2025-01-16 DIAGNOSIS — S60.221A CONTUSION OF DORSUM OF RIGHT HAND: Primary | ICD-10-CM

## 2025-01-16 NOTE — PROGRESS NOTES
[facility]                                     Progress Note    Patient Name: Karuna Ann  Date: 01/016/2025         Service Type: Individual      Session Start Time:   Session End Time     Session Length:     Session #: 5    Attendees: Client attended alone    Service Modality:  Video Visit:      Provider verified identity through the following two step process.  Patient provided:  Patient is known previously to provider    Telemedicine Visit: The patient's condition can be safely assessed and treated via synchronous audio and visual telemedicine encounter.      Reason for Telemedicine Visit: Patient has requested telehealth visit and Services only offered telehealth    Originating Site (Patient Location): Patient's home    Distant Site (Provider Location): [facility] Outpatient Setting: [facility]    Consent:  The patient/guardian has verbally consented to: the potential risks and benefits of telemedicine (video visit) versus in person care; bill my insurance or make self-payment for services provided; and responsibility for payment of non-covered services.     Patient would like the video invitation sent by:  My Chart    Mode of Communication:  Video Conference via Amwell    Distant Location (Provider):  On-site    As the provider I attest to compliance with applicable laws and regulations related to telemedicine.    DATA  Interactive Complexity: No  Crisis: No        Progress Since Last Session (Related to Symptoms / Goals / Homework):   Symptoms: .Some mood changes, mood has been up and down    Homework: Safe Place, Container      Episode of Care Goals: Minimal progress - CONTEMPLATION (Considering change and yet undecided); Intervened by assessing the negative and positive thinking (ambivalence) about behavior change     Current / Ongoing Stressors and Concerns: She     Pt starts class tonight. Her current job has released her from her position but she is still hired by the  [employer]. She heard from the job she interviewed for, they offered her the job. She didn't accept the position due to feeling like the job would ask more from her than she could offer. 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 Karuna saw PT today and doesn't see her MD until February 2nd. Karuna knew she would go back to work wearing a brace. Pt is going to do her PT exercises to increase her  strength.        Treatment Objective(s) Addressed in This Session:   Use coping and soothing skills to calm body and mind       Intervention:       Motivational Interviewing    MI Intervention: Expressed Empathy/Understanding, Open-ended questions, and Reflections: simple and complex     Change Talk Expressed by the Patient: Ability to change Reasons to change    Provider Response to Change Talk: E - Evoked more info from patient about behavior change, A - Affirmed patient's thoughts, decisions, or attempts at behavior change, and S - Summarized patient's change talk statements    Assessments completed prior to visit:  The following assessments were completed by patient for this visit:  PHQ2:       3/23/2022     6:16 AM   PHQ-2 ( 1999 [employer])   Q1: Little interest or pleasure in doing things 1   Q2: Feeling down, depressed or hopeless 1   PHQ-2 Score 2   Q1: Little interest or pleasure in doing things Several days   Q2: Feeling down, depressed or hopeless Several days   PHQ-2 Score 2     GAD2:       12/1/2024    11:48 AM 1/2/2025    10:49 AM   SUSANA-2   Feeling nervous, anxious, or on edge 1 2   Not being able to stop or control worrying 1 2   SUSANA-2 Total Score 2  4        Patient-reported     [place] Suicide Severity Rating Scale (Short Version)      12/6/2021    11:59 AM 10/9/2022    11:50 PM 1/22/2023    10:18 PM 8/22/2024     1:47 AM 12/4/2024     7:00 AM 12/11/2024    10:00 AM 1/2/2025    11:00 AM   Hart Suicide Severity Rating  (Short Version)   Over the past 2 weeks have you felt down, depressed, or hopeless? no no no       Over the past 2 weeks have you had thoughts of killing yourself? no no no       Have you ever attempted to kill yourself? no no no       Q1 Wished to be Dead (Past Month)    0-->no      Q2 Suicidal Thoughts (Past Month)    0-->no      Q6 Suicide Behavior (Lifetime)    0-->no      Level of Risk per Screen    no risks indicated      1. Wish to be Dead (Since Last Contact)     N N N   2. Non-Specific Active Suicidal Thoughts (Since Last Contact)     N N N   Actual Attempt (Since Last Contact)     N N N   Has subject engaged in non-suicidal self-injurious behavior? (Since Last Contact)     N N N   Interrupted Attempts (Since Last Contact)     N N N   Aborted or Self-Interrupted Attempt (Since Last Contact)     N  N   Preparatory Acts or Behavior (Since Last Contact)     N N N   Suicide (Since Last Contact)       N   Calculated C-SSRS Risk Score (Since Last Contact)     No Risk Indicated No Risk Indicated No Risk Indicated     [place] Suicide Severity Rating Scale (Short Version)      12/6/2021    11:59 AM 10/9/2022    11:50 PM 1/22/2023    10:18 PM 8/22/2024     1:47 AM 12/4/2024     7:00 AM 12/11/2024    10:00 AM 1/2/2025    11:00 AM   [place] Suicide Severity Rating (Short Version)   Over the past 2 weeks have you felt down, depressed, or hopeless? no no no       Over the past 2 weeks have you had thoughts of killing yourself? no no no       Have you ever attempted to kill yourself? no no no       Q1 Wished to be Dead (Past Month)    0-->no      Q2 Suicidal Thoughts (Past Month)    0-->no      Q6 Suicide Behavior (Lifetime)    0-->no      Level of Risk per Screen    no risks indicated      1. Wish to be Dead (Since Last Contact)     N N N   2. Non-Specific Active Suicidal Thoughts (Since Last Contact)     N N N   Actual Attempt (Since Last Contact)     N N N   Has subject engaged in non-suicidal self-injurious  behavior? (Since Last Contact)     N N N   Interrupted Attempts (Since Last Contact)     N N N   Aborted or Self-Interrupted Attempt (Since Last Contact)     N  N   Preparatory Acts or Behavior (Since Last Contact)     N N N   Suicide (Since Last Contact)       N   Calculated C-SSRS Risk Score (Since Last Contact)     No Risk Indicated No Risk Indicated No Risk Indicated    [place] Suicide Severity Rating Scale (Short Version)      12/6/2021    11:59 AM 10/9/2022    11:50 PM 1/22/2023    10:18 PM 8/22/2024     1:47 AM 12/4/2024     7:00 AM 12/11/2024    10:00 AM 1/2/2025    11:00 AM   [place] Suicide Severity Rating (Short Version)   Over the past 2 weeks have you felt down, depressed, or hopeless? no no no       Over the past 2 weeks have you had thoughts of killing yourself? no no no       Have you ever attempted to kill yourself? no no no       Q1 Wished to be Dead (Past Month)    0-->no      Q2 Suicidal Thoughts (Past Month)    0-->no      Q6 Suicide Behavior (Lifetime)    0-->no      Level of Risk per Screen    no risks indicated      1. Wish to be Dead (Since Last Contact)     N N N   2. Non-Specific Active Suicidal Thoughts (Since Last Contact)     N N N   Actual Attempt (Since Last Contact)     N N N   Has subject engaged in non-suicidal self-injurious behavior? (Since Last Contact)     N N N   Interrupted Attempts (Since Last Contact)     N N N   Aborted or Self-Interrupted Attempt (Since Last Contact)     N  N   Preparatory Acts or Behavior (Since Last Contact)     N N N   Suicide (Since Last Contact)       N   Calculated C-SSRS Risk Score (Since Last Contact)     No Risk Indicated No Risk Indicated No Risk Indicated          ASSESSMENT: Current Emotional / Mental Status (status of significant symptoms):   Risk status (Self / Other harm or suicidal ideation)   Patient denies current fears or concerns for personal safety.   Patient denies current or recent suicidal ideation or behaviors.   Patient denies  current or recent homicidal ideation or behaviors.   Patient denies current or recent self injurious behavior or ideation.   Patient denies other safety concerns.   Patient reports there has been no change in risk factors since their last session.     Patient reports there has been no change in protective factors since their last session.     Recommended that patient call 911 or go to the local ED should there be a change in any of these risk factors     Appearance:   Appropriate    Eye Contact:   Good    Psychomotor Behavior: Normal    Attitude:   Cooperative    Orientation:   All   Speech    Rate / Production: Normal     Volume:  Normal    Mood:    Normal   Affect:    Appropriate    Thought Content:  Clear    Thought Form:  Coherent  Logical    Insight:    Good      Medication Review:   No changes to current psychiatric medication(s)     Medication Compliance:   Yes     Changes in Health Issues:   None reported     Chemical Use Review:   Substance Use: Chemical use reviewed, no active concerns identified      Tobacco Use: No current tobacco use.      Diagnosis:  No diagnosis found.      Collateral Reports Completed:   None    PLAN: (Patient Tasks / Therapist Tasks / Other)  Develop container and Safe Place        RAUL Zheng                                                         ______________________________________________________________________    Individual Treatment Plan    Patient's Name: Karuna Ann  YOB: 1985    Date of Creation: 12/11/2024  Date Treatment Plan Last Reviewed/Revised: 12/11/2024    DSM5 Diagnoses: 296.32 (F33.1) Major Depressive Disorder, Recurrent Episode, Moderate _ and With anxious distress  Psychosocial / Contextual Factors: Injured at work by student  PROMIS (reviewed every 90 days):     Referral / Collaboration:  Referral to another professional/service is not indicated at this time..    Anticipated number of session for this episode of care: 9-12  sessions  Anticipation frequency of session: Weekly  Anticipated Duration of each session: 38-52 minutes  Treatment plan will be reviewed in 90 days or when goals have been changed.       MeasurableTreatment Goal(s) related to diagnosis / functional impairment(s)  Goal 1:         Treatment Goal(s)        Start Date Goal Dates  Reviewed Status    12/18/2024 Goal 1:  Client will effectively manage symptoms of PTSD     New     Objective #1A (Client Action)  Client will engage in 10 sessions of EMDR or FLASH in the next 90 days..    Intervention(s) (Therapist Action)  Therapist will complete trauma inventory, teach relaxation skills, and conduct therapy session using EMDR or FLASH     New     Objective #1B  Client will understand 4 facts about the development of traumatic stress disorders.    Intervention(s)  Therapist will explain information processing theory, fear structures     New     Objective #1C  (Client Action)  Client will complete at least 10 minutes of self-regulation practice (e.g.: yoga, meditation, relaxation breathing, etc.) per day over the next 12 weeks    Intervention(s)  Therapist will teach self-regulation skills and assign homework.     New           Patient has reviewed and agreed to the above plan.      RAUL Zheng  December 10, 2024

## 2025-01-22 ENCOUNTER — TELEPHONE (OUTPATIENT)
Dept: ORTHOPEDICS | Facility: CLINIC | Age: 40
End: 2025-01-22
Payer: COMMERCIAL

## 2025-01-22 NOTE — TELEPHONE ENCOUNTER
Form or Letter   Patient would like the work [facility]    Could we send this information to you in ebridge or would you prefer to receive a phone call?:   Patient would like to be contacted via [employer]

## 2025-01-22 NOTE — TELEPHONE ENCOUNTER
Provider filled out workability form as patient requested.  Form was faxed to requester.  A copy of the form was sent for scanning.  A copy of the form was kept at [place] in the [facility] binder until original copy is scanned into patient chart.    Perry Coto ATC

## 2025-01-23 ENCOUNTER — VIRTUAL VISIT (OUTPATIENT)
Dept: PSYCHOLOGY | Facility: CLINIC | Age: 40
End: 2025-01-23
Payer: COMMERCIAL

## 2025-01-23 DIAGNOSIS — F33.1 MODERATE EPISODE OF RECURRENT MAJOR DEPRESSIVE DISORDER (H): Primary | ICD-10-CM

## 2025-02-03 PROBLEM — S60.221A CONTUSION OF DORSUM OF RIGHT HAND: Status: RESOLVED | Noted: 2024-11-26 | Resolved: 2025-02-03

## 2025-02-03 PROBLEM — M79.89 SWELLING OF RIGHT HAND: Status: RESOLVED | Noted: 2024-11-26 | Resolved: 2025-02-03

## 2025-02-24 ENCOUNTER — MYC MEDICAL ADVICE (OUTPATIENT)
Dept: ORTHOPEDICS | Facility: CLINIC | Age: 40
End: 2025-02-24
Payer: COMMERCIAL

## 2025-02-24 NOTE — TELEPHONE ENCOUNTER
Reason for Call:  Form, our goal is to have forms completed with 7 days, however, some forms may require a visit or additional information.    Type of letter, form or note:   report of workability      Who is the form from?: Patient    Where did the form come from: form was sent via [employer]    Phone number of person requesting form: [phone] - patient  Can we leave a detailed message on this number:  YES    Desired completion date of form: asap      How will form be returned?:  confirming with patient    Has the patient signed a consent form for release of information (may be included with form)?  Patient provided consent via [employer]    Form was completed and signed by Dr. Yeo. Waiting for patient response on how the form will be returned.    Radha Smith, ATC

## 2025-02-25 NOTE — TELEPHONE ENCOUNTER
Form faxed to patient's employer at [phone] per patient's request. Copy sent to scan. Copy placed in accordion folder.    Radha Smith ATC

## 2025-04-28 ENCOUNTER — OFFICE VISIT (OUTPATIENT)
Dept: ORTHOPEDICS | Facility: CLINIC | Age: 40
End: 2025-04-28
Payer: OTHER MISCELLANEOUS

## 2025-04-28 DIAGNOSIS — S60.221A CONTUSION OF DORSUM OF RIGHT HAND: Primary | ICD-10-CM

## 2025-04-28 DIAGNOSIS — M25.531 RIGHT WRIST PAIN: ICD-10-CM

## 2025-04-28 DIAGNOSIS — M79.641 RIGHT HAND PAIN: ICD-10-CM

## 2025-04-28 PROCEDURE — 99213 OFFICE O/P EST LOW 20 MIN: CPT | Performed by: FAMILY MEDICINE

## 2025-04-28 PROCEDURE — G2211 COMPLEX E/M VISIT ADD ON: HCPCS | Performed by: FAMILY MEDICINE

## 2025-04-28 NOTE — LETTER
April 28, 2025      Karuna Ann  [address] [address]        To Whom It May Concern:    Karuna Ann was seen in our clinic. 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      Sincerely,      Albert Yeo      Electronically signed

## 2025-04-28 NOTE — LETTER
4/28/2025      Karuna Ann  [address] [address]      Dear Colleague,    Thank you for referring your patient, Karuna Ann, to the [facility] [place]. Please see a copy of my visit note below.    ASSESSMENT & PLAN  Patient Instructions     1. Contusion of dorsum of right hand    2. Right wrist pain    3. Right hand pain      -Patient is following up for right hand pain after trauma at work due to soft tissue contusions and resultant weakness  -Patient reports ongoing pain and inflammation with resisted activity  -Patient will continue with hand therapy and home exercise program.  New order was placed today.  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  -Patient will follow-up in 6 weeks for reevaluation and progression of activity  -Call direct clinic number [[phone]] at any time with questions or concerns.    Albert Yeo MD [facility]  [facility]        -----    SUBJECTIVE:  Karuna Ann is a 39 year old female who is seen in follow-up for right hand pain.They were last seen 1/22/2025.     Since their last visit reports 0% - (About the same as last time). They indicate that their current pain level is 3/10. They have tried rest/activity avoidance, ice, heat, Tylenol, Ibuprofen, Occupational Therapy (1 session), home exercises, other medications: Prednisone (Medrol), and casting/splinting/bracing.      The patient is seen by themselves.    Patient's past medical, surgical, social, and family histories were reviewed today and no changes are noted.    REVIEW OF SYSTEMS:  Constitutional: NEGATIVE for fever, chills, change in weight  Skin: NEGATIVE for worrisome rashes, moles or  lesions  GI/: NEGATIVE for bowel or bladder changes  Neuro: NEGATIVE for weakness, dizziness or paresthesias    OBJECTIVE:  There were no vitals taken for this visit.   General: healthy, alert and in no distress  HEENT: no scleral icterus or conjunctival erythema  Skin: no suspicious lesions or rash. No jaundice.  CV: regular rhythm by palpation, no pedal edema  Resp: normal respiratory effort without conversational dyspnea   Psych: normal mood and affect  Gait: normal steady gait with appropriate coordination and balance  Neuro: normal light touch sensory exam of the extremities.    MSK:  RIGHT HAND  Inspection:    No swelling or obvious deformity or asymmetry  Palpation:   Carpals: normal   Metacarpals: 3rd metacarpal, 4th metacarpal, 5th metacarpal   Thumb: normal   Fingers: normal  Range of Motion:    Full active flexion and extension at MCP, PIP, and DIP joints; normal finger cascade without malrotation.  Wrist pronation, supination, and ulnar/radial deviation normal.  Strength:  Grossly intact  Special Tests:    Positive: none    Independent visualization of the below image:        Albert Yeo MD, [facility]      Again, thank you for allowing me to participate in the care of your patient.        Sincerely,        Albert Yeo, MD    Electronically signed

## 2025-04-28 NOTE — PATIENT INSTRUCTIONS
1. Contusion of dorsum of right hand    2. Right wrist pain    3. Right hand pain      -Patient is following up for right hand pain after trauma at work due to soft tissue contusions and resultant weakness  -Patient reports ongoing pain and inflammation with resisted activity  -Patient will continue with hand therapy and home exercise program.  New order was placed today.  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  -Patient will follow-up in 6 weeks for reevaluation and progression of activity  -Call direct clinic number [[phone]] at any time with questions or concerns.    Albert Yeo MD CAM  [place] [facility]  [facility] [place]

## 2025-04-28 NOTE — PROGRESS NOTES
ASSESSMENT & PLAN  Patient Instructions     1. Contusion of dorsum of right hand    2. Right wrist pain    3. Right hand pain      -Patient is following up for right hand pain after trauma at work due to soft tissue contusions and resultant weakness  -Patient reports ongoing pain and inflammation with resisted activity  -Patient will continue with hand therapy and home exercise program.  New order was placed today.  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  -Patient will follow-up in 6 weeks for reevaluation and progression of activity  -Call direct clinic number [[phone]] at any time with questions or concerns.    Albert Yeo MD [facility]  [facility]        -----    SUBJECTIVE:  Karuna Ann is a 39 year old female who is seen in follow-up for right hand pain.They were last seen 1/22/2025.     Since their last visit reports 0% - (About the same as last time). They indicate that their current pain level is 3/10. They have tried rest/activity avoidance, ice, heat, Tylenol, Ibuprofen, Occupational Therapy (1 session), home exercises, other medications: Prednisone (Medrol), and casting/splinting/bracing.      The patient is seen by themselves.    Patient's past medical, surgical, social, and family histories were reviewed today and no changes are noted.    REVIEW OF SYSTEMS:  Constitutional: NEGATIVE for fever, chills, change in weight  Skin: NEGATIVE for worrisome rashes, moles or lesions  GI/: NEGATIVE for bowel or bladder changes  Neuro: NEGATIVE for weakness, dizziness or paresthesias    OBJECTIVE:  There were no vitals taken for this visit.   General: healthy, alert and in no distress  HEENT: no scleral icterus or  conjunctival erythema  Skin: no suspicious lesions or rash. No jaundice.  CV: regular rhythm by palpation, no pedal edema  Resp: normal respiratory effort without conversational dyspnea   Psych: normal mood and affect  Gait: normal steady gait with appropriate coordination and balance  Neuro: normal light touch sensory exam of the extremities.    MSK:  RIGHT HAND  Inspection:    No swelling or obvious deformity or asymmetry  Palpation:   Carpals: normal   Metacarpals: 3rd metacarpal, 4th metacarpal, 5th metacarpal   Thumb: normal   Fingers: normal  Range of Motion:    Full active flexion and extension at MCP, PIP, and DIP joints; normal finger cascade without malrotation.  Wrist pronation, supination, and ulnar/radial deviation normal.  Strength:  Grossly intact  Special Tests:    Positive: none    Independent visualization of the below image:        Albert Yeo MD, [facility]

## 2025-06-02 ENCOUNTER — PATIENT OUTREACH (OUTPATIENT)
Dept: CARE COORDINATION | Facility: CLINIC | Age: 40
End: 2025-06-02
Payer: COMMERCIAL

## 2025-06-03 ENCOUNTER — MYC MEDICAL ADVICE (OUTPATIENT)
Dept: ORTHOPEDICS | Facility: CLINIC | Age: 40
End: 2025-06-03
Payer: COMMERCIAL

## 2025-06-05 NOTE — PROGRESS NOTES
ASSESSMENT & PLAN  Patient Instructions     1. Contusion of dorsum of right hand    2. Right wrist pain    3. Right hand pain    4. 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  If she has plateaued in hand therapy, to consider once again work hardening program  -Call direct clinic number [[phone]] at any time with questions or concerns.    Albert Yeo MD [facility]  [facility]        -----    SUBJECTIVE:  Karuna Ann is a 40 year old female who is seen in follow-up for right hand.They were last seen 4/28/2025. At that time, the plan was continue hand therapy/HEP, follow-up in 6 weeks. Today, the patient reports that it took a while to get her hand therapy approved through the State, she has not yet scheduled hand therapy but has plans to. She does note recurrent swelling with activity. She recently moved to a new house and this was difficult on her hand.    Since their last visit reports 25% improvement. They indicate that their current pain level is 0/10. They have tried rest/activity avoidance, ice, heat, Tylenol, Ibuprofen, Occupational Therapy (1 session), home exercises, other medications: Prednisone (Medrol), and casting/splinting/bracing.       The patient is seen by themselves.    Patient's past medical, surgical, social, and family histories were reviewed today and no changes are noted.    REVIEW OF SYSTEMS:  Constitutional: NEGATIVE for fever, chills, change in weight  Skin: NEGATIVE for worrisome rashes, moles or lesions  GI/: NEGATIVE for bowel  or bladder changes  Neuro: NEGATIVE for weakness, dizziness or paresthesias    OBJECTIVE:  There were no vitals taken for this visit.   General: healthy, alert and in no distress  HEENT: no scleral icterus or conjunctival erythema  Skin: no suspicious lesions or rash. No jaundice.  CV: regular rhythm by palpation, no pedal edema  Resp: normal respiratory effort without conversational dyspnea   Psych: normal mood and affect  Gait: normal steady gait with appropriate coordination and balance  Neuro: normal light touch sensory exam of the extremities.    MSK:  RIGHT HAND  Inspection:    No swelling or obvious deformity or asymmetry  Palpation:   Carpals: normal   Metacarpals: 3rd metacarpal, 4th metacarpal, 5th metacarpal   Thumb: normal   Fingers: normal  Range of Motion:    Full active flexion and extension at MCP, PIP, and DIP joints; normal finger cascade without malrotation.  Wrist pronation, supination, and ulnar/radial deviation normal.  Strength:  Grossly intact but weaker compared to left hand  Special Tests:    Positive: none    Independent visualization of the below image:      Albert Yeo MD, CAM  [place] [facility]

## 2025-06-10 ENCOUNTER — OFFICE VISIT (OUTPATIENT)
Dept: ORTHOPEDICS | Facility: CLINIC | Age: 40
End: 2025-06-10
Payer: OTHER MISCELLANEOUS

## 2025-06-10 DIAGNOSIS — S60.221A CONTUSION OF DORSUM OF RIGHT HAND: Primary | ICD-10-CM

## 2025-06-10 DIAGNOSIS — R29.898 RIGHT HAND WEAKNESS: ICD-10-CM

## 2025-06-10 DIAGNOSIS — M25.531 RIGHT WRIST PAIN: ICD-10-CM

## 2025-06-10 DIAGNOSIS — M79.641 RIGHT HAND PAIN: ICD-10-CM

## 2025-06-10 PROCEDURE — G2211 COMPLEX E/M VISIT ADD ON: HCPCS | Performed by: FAMILY MEDICINE

## 2025-06-10 PROCEDURE — 99213 OFFICE O/P EST LOW 20 MIN: CPT | Performed by: FAMILY MEDICINE

## 2025-06-10 NOTE — LETTER
Margarita 10, 2025      Karuna Ann  [address] [address]        To Whom It May Concern:    Karuna Ann was seen in our clinic. 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      Sincerely,      Albert Yeo      Electronically signed

## 2025-06-10 NOTE — PATIENT INSTRUCTIONS
1. Contusion of dorsum of right hand    2. Right wrist pain    3. Right hand pain    4. 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  If she has plateaued in hand therapy, to consider once again work hardening program  -Call direct clinic number [[phone]] at any time with questions or concerns.    Albert Yeo MD CAQSM  [facility]  [facility] [place]

## 2025-06-10 NOTE — LETTER
6/10/2025      Karuna Ann  [address] [address]      Dear Colleague,    Thank you for referring your patient, Karuna Ann, to the [facility] [place]. Please see a copy of my visit note below.    ASSESSMENT & PLAN  Patient Instructions     1. Contusion of dorsum of right hand    2. Right wrist pain    3. Right hand pain    4. 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  If she has plateaued in hand therapy, to consider once again work hardening program  -Call direct clinic number [[phone]] at any time with questions or concerns.    Albert Yeo MD [facility]  [facility] [place]        -----    SUBJECTIVE:  Karuna Ann is a 40 year old female who is seen in follow-up for right hand.They were last seen 4/28/2025. At that time, the plan was continue hand therapy/HEP, follow-up in 6 weeks. Today, the patient reports that it took a while to get her hand therapy approved through the State, she has not yet scheduled hand therapy but has plans to. She does note recurrent swelling with activity. She recently moved to a new house and this was difficult on her hand.    Since their last visit reports 25% improvement. They indicate that their current pain level is 0/10. They have tried rest/activity avoidance, ice, heat, Tylenol, Ibuprofen, Occupational Therapy (1 session), home exercises, other medications: Prednisone (Medrol), and casting/splinting/bracing.       The patient is seen by themselves.    Patient's past medical,  surgical, social, and family histories were reviewed today and no changes are noted.    REVIEW OF SYSTEMS:  Constitutional: NEGATIVE for fever, chills, change in weight  Skin: NEGATIVE for worrisome rashes, moles or lesions  GI/: NEGATIVE for bowel or bladder changes  Neuro: NEGATIVE for weakness, dizziness or paresthesias    OBJECTIVE:  There were no vitals taken for this visit.   General: healthy, alert and in no distress  HEENT: no scleral icterus or conjunctival erythema  Skin: no suspicious lesions or rash. No jaundice.  CV: regular rhythm by palpation, no pedal edema  Resp: normal respiratory effort without conversational dyspnea   Psych: normal mood and affect  Gait: normal steady gait with appropriate coordination and balance  Neuro: normal light touch sensory exam of the extremities.    MSK:  RIGHT HAND  Inspection:    No swelling or obvious deformity or asymmetry  Palpation:   Carpals: normal   Metacarpals: 3rd metacarpal, 4th metacarpal, 5th metacarpal   Thumb: normal   Fingers: normal  Range of Motion:    Full active flexion and extension at MCP, PIP, and DIP joints; normal finger cascade without malrotation.  Wrist pronation, supination, and ulnar/radial deviation normal.  Strength:  Grossly intact but weaker compared to left hand  Special Tests:    Positive: none    Independent visualization of the below image:      Albert Yeo MD, [facility]        Again, thank you for allowing me to participate in the care of your patient.        Sincerely,        Albert Yeo, MD    Electronically signed

## 2025-06-16 ENCOUNTER — PATIENT OUTREACH (OUTPATIENT)
Dept: CARE COORDINATION | Facility: CLINIC | Age: 40
End: 2025-06-16
Payer: COMMERCIAL

## 2025-08-04 ENCOUNTER — MYC MEDICAL ADVICE (OUTPATIENT)
Dept: ORTHOPEDICS | Facility: CLINIC | Age: 40
End: 2025-08-04
Payer: COMMERCIAL

## 2025-08-04 ENCOUNTER — TELEPHONE (OUTPATIENT)
Dept: ORTHOPEDICS | Facility: CLINIC | Age: 40
End: 2025-08-04
Payer: COMMERCIAL

## 2025-08-04 ENCOUNTER — OFFICE VISIT (OUTPATIENT)
Dept: ORTHOPEDICS | Facility: CLINIC | Age: 40
End: 2025-08-04
Payer: OTHER MISCELLANEOUS

## 2025-08-04 DIAGNOSIS — M25.531 RIGHT WRIST PAIN: Primary | ICD-10-CM

## 2025-08-04 DIAGNOSIS — R29.898 RIGHT HAND WEAKNESS: ICD-10-CM

## 2025-08-04 DIAGNOSIS — M79.89 SWELLING OF RIGHT HAND: ICD-10-CM

## 2025-08-04 DIAGNOSIS — M65.90 TENOSYNOVITIS: ICD-10-CM

## 2025-08-04 PROCEDURE — G2211 COMPLEX E/M VISIT ADD ON: HCPCS | Performed by: FAMILY MEDICINE

## 2025-08-04 PROCEDURE — 99213 OFFICE O/P EST LOW 20 MIN: CPT | Performed by: FAMILY MEDICINE

## 2025-08-09 ENCOUNTER — HEALTH MAINTENANCE LETTER (OUTPATIENT)
Age: 40
End: 2025-08-09

## 2025-08-19 ENCOUNTER — VIRTUAL VISIT (OUTPATIENT)
Dept: INTERNAL MEDICINE | Facility: CLINIC | Age: 40
End: 2025-08-19
Payer: COMMERCIAL

## 2025-08-19 DIAGNOSIS — E23.6 EMPTY SELLA: ICD-10-CM

## 2025-08-19 DIAGNOSIS — J45.30 MILD PERSISTENT ASTHMA WITHOUT COMPLICATION: ICD-10-CM

## 2025-08-19 DIAGNOSIS — R51.9 ACUTE INTRACTABLE HEADACHE, UNSPECIFIED HEADACHE TYPE: Primary | ICD-10-CM

## 2025-08-19 PROCEDURE — 98006 SYNCH AUDIO-VIDEO EST MOD 30: CPT | Performed by: STUDENT IN AN ORGANIZED HEALTH CARE EDUCATION/TRAINING PROGRAM

## 2025-08-19 RX ORDER — KETOROLAC TROMETHAMINE 10 MG/1
10 TABLET, FILM COATED ORAL EVERY 6 HOURS PRN
Qty: 12 TABLET | Refills: 0 | Status: SHIPPED | OUTPATIENT
Start: 2025-08-19

## 2025-08-19 RX ORDER — FLUTICASONE PROPIONATE AND SALMETEROL 250; 50 UG/1; UG/1
1 POWDER RESPIRATORY (INHALATION) EVERY 12 HOURS
Qty: 60 EACH | Refills: 1 | Status: SHIPPED | OUTPATIENT
Start: 2025-08-19

## 2025-08-19 RX ORDER — ALBUTEROL SULFATE 90 UG/1
2 INHALANT RESPIRATORY (INHALATION) EVERY 6 HOURS PRN
Qty: 18 G | Refills: 1 | Status: SHIPPED | OUTPATIENT
Start: 2025-08-19

## 2025-08-19 ASSESSMENT — ASTHMA QUESTIONNAIRES
QUESTION_2 LAST FOUR WEEKS HOW OFTEN HAVE YOU HAD SHORTNESS OF BREATH: ONCE OR TWICE A WEEK
QUESTION_3 LAST FOUR WEEKS HOW OFTEN DID YOUR ASTHMA SYMPTOMS (WHEEZING, COUGHING, SHORTNESS OF BREATH, CHEST TIGHTNESS OR PAIN) WAKE YOU UP AT NIGHT OR EARLIER THAN USUAL IN THE MORNING: ONCE OR TWICE
QUESTION_1 LAST FOUR WEEKS HOW MUCH OF THE TIME DID YOUR ASTHMA KEEP YOU FROM GETTING AS MUCH DONE AT WORK, SCHOOL OR AT HOME: SOME OF THE TIME
ACT_TOTALSCORE: 17
QUESTION_5 LAST FOUR WEEKS HOW WOULD YOU RATE YOUR ASTHMA CONTROL: SOMEWHAT CONTROLLED
QUESTION_4 LAST FOUR WEEKS HOW OFTEN HAVE YOU USED YOUR RESCUE INHALER OR NEBULIZER MEDICATION (SUCH AS ALBUTEROL): TWO OR THREE TIMES PER WEEK

## 2025-08-19 ASSESSMENT — PATIENT HEALTH QUESTIONNAIRE - PHQ9
SUM OF ALL RESPONSES TO PHQ QUESTIONS 1-9: 9
SUM OF ALL RESPONSES TO PHQ QUESTIONS 1-9: 9
10. IF YOU CHECKED OFF ANY PROBLEMS, HOW DIFFICULT HAVE THESE PROBLEMS MADE IT FOR YOU TO DO YOUR WORK, TAKE CARE OF THINGS AT HOME, OR GET ALONG WITH OTHER PEOPLE: SOMEWHAT DIFFICULT

## 2025-08-20 ENCOUNTER — TELEPHONE (OUTPATIENT)
Dept: OPHTHALMOLOGY | Facility: CLINIC | Age: 40
End: 2025-08-20
Payer: COMMERCIAL

## 2025-08-20 ENCOUNTER — HOSPITAL ENCOUNTER (OUTPATIENT)
Dept: MRI IMAGING | Facility: CLINIC | Age: 40
Discharge: HOME | End: 2025-08-20
Attending: STUDENT IN AN ORGANIZED HEALTH CARE EDUCATION/TRAINING PROGRAM
Payer: COMMERCIAL

## 2025-08-20 DIAGNOSIS — E23.6 EMPTY SELLA: ICD-10-CM

## 2025-08-20 DIAGNOSIS — R51.9 ACUTE INTRACTABLE HEADACHE, UNSPECIFIED HEADACHE TYPE: ICD-10-CM

## 2025-08-20 PROCEDURE — 255N000002 HC RX 255 OP 636: Performed by: STUDENT IN AN ORGANIZED HEALTH CARE EDUCATION/TRAINING PROGRAM

## 2025-08-20 PROCEDURE — 70546 MR ANGIOGRAPH HEAD W/O&W/DYE: CPT

## 2025-08-20 PROCEDURE — A9585 GADOBUTROL INJECTION: HCPCS | Performed by: STUDENT IN AN ORGANIZED HEALTH CARE EDUCATION/TRAINING PROGRAM

## 2025-08-20 RX ORDER — GADOBUTROL 604.72 MG/ML
10 INJECTION INTRAVENOUS ONCE
Status: COMPLETED | OUTPATIENT
Start: 2025-08-20 | End: 2025-08-20

## 2025-08-20 RX ADMIN — GADOBUTROL 10 ML: 604.72 INJECTION INTRAVENOUS at 16:43
